# Patient Record
Sex: MALE | Race: WHITE | Employment: OTHER | URBAN - METROPOLITAN AREA
[De-identification: names, ages, dates, MRNs, and addresses within clinical notes are randomized per-mention and may not be internally consistent; named-entity substitution may affect disease eponyms.]

---

## 2017-04-17 ENCOUNTER — HOSPITAL ENCOUNTER (OUTPATIENT)
Dept: CT IMAGING | Age: 82
Discharge: HOME | End: 2017-04-17
Attending: FAMILY MEDICINE | Admitting: FAMILY MEDICINE

## 2017-04-17 DIAGNOSIS — R74.01 NONSPECIFIC ELEVATION OF LEVELS OF TRANSAMINASE AND LACTIC ACID DEHYDROGENASE (LDH): ICD-10-CM

## 2017-04-17 DIAGNOSIS — Z85.51 PERSONAL HISTORY OF MALIGNANT NEOPLASM OF BLADDER: ICD-10-CM

## 2017-04-17 DIAGNOSIS — R74.01 NONSPECIFIC ELEVATION OF LEVELS OF TRANSAMINASE OR LACTIC ACID DEHYDROGENASE (LDH): ICD-10-CM

## 2017-04-17 DIAGNOSIS — R74.02 NONSPECIFIC ELEVATION OF LEVELS OF TRANSAMINASE AND LACTIC ACID DEHYDROGENASE (LDH): ICD-10-CM

## 2017-04-17 DIAGNOSIS — R74.02 NONSPECIFIC ELEVATION OF LEVELS OF TRANSAMINASE OR LACTIC ACID DEHYDROGENASE (LDH): ICD-10-CM

## 2017-04-17 LAB — POC CREATININE: 1.1 MG/DL (ref 0.9–1.3)

## 2017-04-17 RX ORDER — SODIUM CHLORIDE 0.9 % (FLUSH) 0.9 %
10 SYRINGE (ML) INJECTION
Status: COMPLETED | OUTPATIENT
Start: 2017-04-17 | End: 2017-04-17

## 2017-04-17 RX ADMIN — Medication 10 ML: at 13:03

## 2017-06-12 ENCOUNTER — HOSPITAL ENCOUNTER (OUTPATIENT)
Dept: GENERAL RADIOLOGY | Age: 82
Discharge: HOME | End: 2017-06-12
Attending: INTERNAL MEDICINE | Admitting: INTERNAL MEDICINE

## 2017-06-12 LAB
ANION GAP SERPL CALCULATED.3IONS-SCNC: 15 MMOL/L (ref 4–16)
APTT: 39.5 SECONDS (ref 21.2–33)
BASOPHILS ABSOLUTE: 0.1 K/CU MM
BASOPHILS RELATIVE PERCENT: 1 % (ref 0–1)
BUN BLDV-MCNC: 16 MG/DL (ref 6–23)
CALCIUM SERPL-MCNC: 10.1 MG/DL (ref 8.3–10.6)
CHLORIDE BLD-SCNC: 101 MMOL/L (ref 99–110)
CO2: 23 MMOL/L (ref 21–32)
CREAT SERPL-MCNC: 1 MG/DL (ref 0.9–1.3)
DIFFERENTIAL TYPE: ABNORMAL
EOSINOPHILS ABSOLUTE: 0.2 K/CU MM
EOSINOPHILS RELATIVE PERCENT: 2.1 % (ref 0–3)
GFR AFRICAN AMERICAN: >60 ML/MIN/1.73M2
GFR NON-AFRICAN AMERICAN: >60 ML/MIN/1.73M2
GLUCOSE BLD-MCNC: 140 MG/DL (ref 70–140)
HCT VFR BLD CALC: 45.5 % (ref 42–52)
HEMOGLOBIN: 15.1 GM/DL (ref 13.5–18)
IMMATURE NEUTROPHIL %: 0.1 % (ref 0–0.43)
INR BLD: 1.06 INDEX
LYMPHOCYTES ABSOLUTE: 2.4 K/CU MM
LYMPHOCYTES RELATIVE PERCENT: 33.9 % (ref 24–44)
MCH RBC QN AUTO: 32.7 PG (ref 27–31)
MCHC RBC AUTO-ENTMCNC: 33.2 % (ref 32–36)
MCV RBC AUTO: 98.5 FL (ref 78–100)
MONOCYTES ABSOLUTE: 0.5 K/CU MM
MONOCYTES RELATIVE PERCENT: 7.2 % (ref 0–4)
NUCLEATED RBC %: 0 %
PDW BLD-RTO: 13.5 % (ref 11.7–14.9)
PLATELET # BLD: 175 K/CU MM (ref 140–440)
PMV BLD AUTO: 11.1 FL (ref 7.5–11.1)
POTASSIUM SERPL-SCNC: 4.3 MMOL/L (ref 3.5–5.1)
PROTHROMBIN TIME: 12.1 SECONDS (ref 9.12–12.5)
RBC # BLD: 4.62 M/CU MM (ref 4.6–6.2)
SEGMENTED NEUTROPHILS ABSOLUTE COUNT: 3.9 K/CU MM
SEGMENTED NEUTROPHILS RELATIVE PERCENT: 55.7 % (ref 36–66)
SODIUM BLD-SCNC: 139 MMOL/L (ref 135–145)
TOTAL IMMATURE NEUTOROPHIL: 0.01 K/CU MM
TOTAL NUCLEATED RBC: 0 K/CU MM
WBC # BLD: 7 K/CU MM (ref 4–10.5)

## 2017-06-20 PROBLEM — I25.10 CORONARY ARTERY DISEASE: Status: ACTIVE | Noted: 2017-06-20

## 2017-06-20 PROBLEM — K80.20 CHOLELITHIASIS: Status: ACTIVE | Noted: 2017-06-20

## 2017-06-20 PROBLEM — K74.60 CIRRHOSIS (HCC): Status: ACTIVE | Noted: 2017-06-20

## 2017-06-23 ENCOUNTER — HOSPITAL ENCOUNTER (OUTPATIENT)
Dept: PREADMISSION TESTING | Age: 82
Discharge: HOME | End: 2017-06-23
Attending: THORACIC SURGERY (CARDIOTHORACIC VASCULAR SURGERY) | Admitting: THORACIC SURGERY (CARDIOTHORACIC VASCULAR SURGERY)

## 2017-06-23 VITALS
SYSTOLIC BLOOD PRESSURE: 119 MMHG | BODY MASS INDEX: 31.8 KG/M2 | WEIGHT: 227.13 LBS | TEMPERATURE: 96.4 F | DIASTOLIC BLOOD PRESSURE: 56 MMHG | OXYGEN SATURATION: 96 % | HEIGHT: 71 IN | RESPIRATION RATE: 16 BRPM | HEART RATE: 66 BPM

## 2017-06-23 LAB
ANION GAP SERPL CALCULATED.3IONS-SCNC: 14 MMOL/L (ref 4–16)
APTT: 43.6 SECONDS (ref 21.2–33)
BACTERIA: NEGATIVE /HPF
BASOPHILS ABSOLUTE: 0.1 K/CU MM
BASOPHILS RELATIVE PERCENT: 1 % (ref 0–1)
BILIRUBIN URINE: NEGATIVE MG/DL
BLOOD, URINE: NEGATIVE
BUN BLDV-MCNC: 17 MG/DL (ref 6–23)
CALCIUM SERPL-MCNC: 9.8 MG/DL (ref 8.3–10.6)
CHLORIDE BLD-SCNC: 100 MMOL/L (ref 99–110)
CLARITY: CLEAR
CO2: 24 MMOL/L (ref 21–32)
COLOR: ABNORMAL
CREAT SERPL-MCNC: 1 MG/DL (ref 0.9–1.3)
DIFFERENTIAL TYPE: ABNORMAL
EOSINOPHILS ABSOLUTE: 0.1 K/CU MM
EOSINOPHILS RELATIVE PERCENT: 1.8 % (ref 0–3)
GFR AFRICAN AMERICAN: >60 ML/MIN/1.73M2
GFR NON-AFRICAN AMERICAN: >60 ML/MIN/1.73M2
GLUCOSE BLD-MCNC: 150 MG/DL (ref 70–140)
GLUCOSE, URINE: NEGATIVE MG/DL
HCT VFR BLD CALC: 44.1 % (ref 42–52)
HEMOGLOBIN: 14.7 GM/DL (ref 13.5–18)
IMMATURE NEUTROPHIL %: 0.1 % (ref 0–0.43)
INR BLD: 1.09 INDEX
KETONES, URINE: NEGATIVE MG/DL
LEUKOCYTE ESTERASE, URINE: NEGATIVE
LYMPHOCYTES ABSOLUTE: 2.1 K/CU MM
LYMPHOCYTES RELATIVE PERCENT: 29.1 % (ref 24–44)
MAGNESIUM: 1.9 MG/DL (ref 1.8–2.4)
MCH RBC QN AUTO: 32.8 PG (ref 27–31)
MCHC RBC AUTO-ENTMCNC: 33.3 % (ref 32–36)
MCV RBC AUTO: 98.4 FL (ref 78–100)
MONOCYTES ABSOLUTE: 0.5 K/CU MM
MONOCYTES RELATIVE PERCENT: 7.1 % (ref 0–4)
MUCUS: ABNORMAL HPF
NITRITE URINE, QUANTITATIVE: NEGATIVE
NUCLEATED RBC %: 0 %
PDW BLD-RTO: 13.3 % (ref 11.7–14.9)
PH, URINE: 5 (ref 5–8)
PLATELET # BLD: 166 K/CU MM (ref 140–440)
PMV BLD AUTO: 10.5 FL (ref 7.5–11.1)
POTASSIUM SERPL-SCNC: 4.4 MMOL/L (ref 3.5–5.1)
PROTEIN UA: NEGATIVE MG/DL
PROTHROMBIN TIME: 12.5 SECONDS (ref 9.12–12.5)
RBC # BLD: 4.48 M/CU MM (ref 4.6–6.2)
RBC URINE: <1 /HPF (ref 0–3)
SEGMENTED NEUTROPHILS ABSOLUTE COUNT: 4.4 K/CU MM
SEGMENTED NEUTROPHILS RELATIVE PERCENT: 60.9 % (ref 36–66)
SODIUM BLD-SCNC: 138 MMOL/L (ref 135–145)
SPECIFIC GRAVITY UA: 1.01 (ref 1–1.03)
SQUAMOUS EPITHELIAL: <1 /HPF
TOTAL IMMATURE NEUTOROPHIL: 0.01 K/CU MM
TOTAL NUCLEATED RBC: 0 K/CU MM
UROBILINOGEN, URINE: NORMAL MG/DL (ref 0.2–1)
WBC # BLD: 7.2 K/CU MM (ref 4–10.5)
WBC UA: <1 /HPF (ref 0–2)

## 2017-06-23 RX ORDER — SIMVASTATIN 40 MG
40 TABLET ORAL ONCE
Status: CANCELLED | OUTPATIENT
Start: 2017-06-26

## 2017-06-23 RX ORDER — CARVEDILOL 6.25 MG/1
6.25 TABLET ORAL 2 TIMES DAILY
Status: ON HOLD | COMMUNITY
End: 2017-07-07 | Stop reason: HOSPADM

## 2017-06-23 RX ORDER — CARVEDILOL 3.12 MG/1
3.12 TABLET ORAL ONCE
Status: CANCELLED | OUTPATIENT
Start: 2017-06-26

## 2017-06-23 RX ORDER — CEFAZOLIN SODIUM 2 G/100ML
2 INJECTION, SOLUTION INTRAVENOUS ONCE
Status: CANCELLED | OUTPATIENT
Start: 2017-06-26

## 2017-06-23 RX ORDER — COLCHICINE 0.6 MG/1
0.6 TABLET ORAL 2 TIMES DAILY
COMMUNITY

## 2017-06-23 RX ORDER — ESOMEPRAZOLE MAGNESIUM 40 MG/1
40 CAPSULE, DELAYED RELEASE ORAL EVERY MORNING
COMMUNITY
End: 2019-10-01 | Stop reason: ALTCHOICE

## 2017-06-23 RX ORDER — CHLORHEXIDINE GLUCONATE 0.12 MG/ML
30 RINSE ORAL ONCE
Status: CANCELLED | OUTPATIENT
Start: 2017-06-26

## 2017-06-23 RX ORDER — LOSARTAN POTASSIUM 50 MG/1
50 TABLET ORAL EVERY MORNING
Status: ON HOLD | COMMUNITY
End: 2017-07-12 | Stop reason: HOSPADM

## 2017-06-23 ASSESSMENT — PAIN SCALES - GENERAL: PAINLEVEL_OUTOF10: 0

## 2017-06-24 LAB
CULTURE: NORMAL
REPORT STATUS: NORMAL
REQUEST PROBLEM: NORMAL
SPECIMEN: NORMAL

## 2017-07-01 ENCOUNTER — HOSPITAL ENCOUNTER (OUTPATIENT)
Dept: OTHER | Age: 82
Discharge: HOME | End: 2017-07-01
Attending: SKILLED NURSING FACILITY | Admitting: SKILLED NURSING FACILITY

## 2017-07-12 PROBLEM — I49.8 BIGEMINY: Status: ACTIVE | Noted: 2017-07-12

## 2017-07-12 PROCEDURE — 99305 1ST NF CARE MODERATE MDM 35: CPT | Performed by: INTERNAL MEDICINE

## 2017-07-13 LAB
HCT VFR BLD CALC: 34.2 % (ref 42–52)
HEMOGLOBIN: 10.9 GM/DL (ref 13.5–18)
MCH RBC QN AUTO: 32.2 PG (ref 27–31)
MCHC RBC AUTO-ENTMCNC: 31.9 % (ref 32–36)
MCV RBC AUTO: 100.9 FL (ref 78–100)
PDW BLD-RTO: 14.2 % (ref 11.7–14.9)
PLATELET # BLD: 329 K/CU MM (ref 140–440)
PMV BLD AUTO: 10.9 FL (ref 7.5–11.1)
RBC # BLD: 3.39 M/CU MM (ref 4.6–6.2)
WBC # BLD: 8.5 K/CU MM (ref 4–10.5)

## 2017-07-20 LAB
HCT VFR BLD CALC: 36.9 % (ref 42–52)
HEMOGLOBIN: 11.8 GM/DL (ref 13.5–18)
MCH RBC QN AUTO: 32.1 PG (ref 27–31)
MCHC RBC AUTO-ENTMCNC: 32 % (ref 32–36)
MCV RBC AUTO: 100.3 FL (ref 78–100)
PDW BLD-RTO: 13.8 % (ref 11.7–14.9)
PLATELET # BLD: 305 K/CU MM (ref 140–440)
PMV BLD AUTO: 11 FL (ref 7.5–11.1)
RBC # BLD: 3.68 M/CU MM (ref 4.6–6.2)
WBC # BLD: 6.6 K/CU MM (ref 4–10.5)

## 2017-07-27 LAB
HCT VFR BLD CALC: 43.7 % (ref 42–52)
HEMOGLOBIN: 13.9 GM/DL (ref 13.5–18)
MCH RBC QN AUTO: 32.1 PG (ref 27–31)
MCHC RBC AUTO-ENTMCNC: 31.8 % (ref 32–36)
MCV RBC AUTO: 100.9 FL (ref 78–100)
PDW BLD-RTO: 13.5 % (ref 11.7–14.9)
PLATELET # BLD: 292 K/CU MM (ref 140–440)
PMV BLD AUTO: 10.3 FL (ref 7.5–11.1)
RBC # BLD: 4.33 M/CU MM (ref 4.6–6.2)
WBC # BLD: 7.1 K/CU MM (ref 4–10.5)

## 2017-08-06 ENCOUNTER — OUTSIDE SERVICES (OUTPATIENT)
Dept: INTERNAL MEDICINE CLINIC | Age: 82
End: 2017-08-06

## 2019-04-02 LAB
A/G RATIO: 1.1 (ref 1.1–2.2)
ALBUMIN SERPL-MCNC: 3.9 G/DL (ref 3.4–5)
ALP BLD-CCNC: 82 U/L (ref 40–129)
ALT SERPL-CCNC: 13 U/L (ref 10–40)
ANION GAP SERPL CALCULATED.3IONS-SCNC: 12 MMOL/L (ref 3–16)
AST SERPL-CCNC: 24 U/L (ref 15–37)
BASOPHILS ABSOLUTE: 0.1 K/UL (ref 0–0.2)
BASOPHILS RELATIVE PERCENT: 1.2 %
BILIRUB SERPL-MCNC: 1 MG/DL (ref 0–1)
BUN BLDV-MCNC: 16 MG/DL (ref 7–20)
CALCIUM SERPL-MCNC: 9.8 MG/DL (ref 8.3–10.6)
CHLORIDE BLD-SCNC: 101 MMOL/L (ref 99–110)
CHOLESTEROL, TOTAL: 200 MG/DL (ref 0–199)
CO2: 25 MMOL/L (ref 21–32)
CREAT SERPL-MCNC: 1.1 MG/DL (ref 0.8–1.3)
EOSINOPHILS ABSOLUTE: 0.1 K/UL (ref 0–0.6)
EOSINOPHILS RELATIVE PERCENT: 2.5 %
GFR AFRICAN AMERICAN: >60
GFR NON-AFRICAN AMERICAN: >60
GLOBULIN: 3.7 G/DL
GLUCOSE BLD-MCNC: 116 MG/DL (ref 70–99)
HCT VFR BLD CALC: 41.1 % (ref 40.5–52.5)
HDLC SERPL-MCNC: 33 MG/DL (ref 40–60)
HEMOGLOBIN: 13.2 G/DL (ref 13.5–17.5)
LDL CHOLESTEROL CALCULATED: 143 MG/DL
LYMPHOCYTES ABSOLUTE: 2.1 K/UL (ref 1–5.1)
LYMPHOCYTES RELATIVE PERCENT: 36.2 %
MCH RBC QN AUTO: 29.4 PG (ref 26–34)
MCHC RBC AUTO-ENTMCNC: 32.2 G/DL (ref 31–36)
MCV RBC AUTO: 91.2 FL (ref 80–100)
MONOCYTES ABSOLUTE: 0.5 K/UL (ref 0–1.3)
MONOCYTES RELATIVE PERCENT: 8.7 %
NEUTROPHILS ABSOLUTE: 3 K/UL (ref 1.7–7.7)
NEUTROPHILS RELATIVE PERCENT: 51.4 %
PDW BLD-RTO: 16.4 % (ref 12.4–15.4)
PLATELET # BLD: 176 K/UL (ref 135–450)
PMV BLD AUTO: 9.2 FL (ref 5–10.5)
POTASSIUM SERPL-SCNC: 5.2 MMOL/L (ref 3.5–5.1)
RBC # BLD: 4.5 M/UL (ref 4.2–5.9)
SODIUM BLD-SCNC: 138 MMOL/L (ref 136–145)
TOTAL PROTEIN: 7.6 G/DL (ref 6.4–8.2)
TRIGL SERPL-MCNC: 121 MG/DL (ref 0–150)
TSH SERPL DL<=0.05 MIU/L-ACNC: 2.55 UIU/ML (ref 0.27–4.2)
VLDLC SERPL CALC-MCNC: 24 MG/DL
WBC # BLD: 5.8 K/UL (ref 4–11)

## 2019-08-04 DIAGNOSIS — R97.20 RAISED PROSTATE SPECIFIC ANTIGEN: ICD-10-CM

## 2019-08-04 DIAGNOSIS — K74.60 CIRRHOSIS, NON-ALCOHOLIC (HCC): ICD-10-CM

## 2019-08-04 DIAGNOSIS — Z85.46 HX OF MALIGNANT NEOPLASM OF PROSTATE: ICD-10-CM

## 2019-08-04 DIAGNOSIS — I10 HYPERTENSION, ESSENTIAL: ICD-10-CM

## 2019-08-04 DIAGNOSIS — R79.89 OTHER SPECIFIED ABNORMAL FINDINGS OF BLOOD CHEMISTRY: ICD-10-CM

## 2019-08-04 DIAGNOSIS — E04.1 THYROID NODULE: ICD-10-CM

## 2019-08-04 DIAGNOSIS — C61 MALIGNANT TUMOR OF PROSTATE (HCC): ICD-10-CM

## 2019-08-04 DIAGNOSIS — R31.0 FRANK HEMATURIA: ICD-10-CM

## 2019-08-04 RX ORDER — ATORVASTATIN CALCIUM 10 MG/1
10 TABLET, FILM COATED ORAL DAILY
COMMUNITY
End: 2019-10-01

## 2019-08-04 RX ORDER — FERROUS SULFATE 325(65) MG
325 TABLET ORAL 2 TIMES DAILY
COMMUNITY
End: 2019-10-01

## 2019-08-04 RX ORDER — PANTOPRAZOLE SODIUM 40 MG/1
40 TABLET, DELAYED RELEASE ORAL DAILY
COMMUNITY
End: 2020-04-01 | Stop reason: SDUPTHER

## 2019-10-01 ENCOUNTER — OFFICE VISIT (OUTPATIENT)
Dept: FAMILY MEDICINE CLINIC | Age: 84
End: 2019-10-01
Payer: MEDICARE

## 2019-10-01 VITALS
WEIGHT: 242.8 LBS | SYSTOLIC BLOOD PRESSURE: 115 MMHG | HEART RATE: 60 BPM | HEIGHT: 73 IN | DIASTOLIC BLOOD PRESSURE: 72 MMHG | BODY MASS INDEX: 32.18 KG/M2

## 2019-10-01 DIAGNOSIS — Z23 NEED FOR PROPHYLACTIC VACCINATION AND INOCULATION AGAINST VARICELLA: ICD-10-CM

## 2019-10-01 DIAGNOSIS — I10 ESSENTIAL HYPERTENSION: ICD-10-CM

## 2019-10-01 DIAGNOSIS — I25.10 CORONARY ARTERY DISEASE INVOLVING NATIVE CORONARY ARTERY OF NATIVE HEART WITHOUT ANGINA PECTORIS: ICD-10-CM

## 2019-10-01 DIAGNOSIS — Z85.46 HX OF MALIGNANT NEOPLASM OF PROSTATE: Primary | ICD-10-CM

## 2019-10-01 DIAGNOSIS — I10 HYPERTENSION, ESSENTIAL: ICD-10-CM

## 2019-10-01 DIAGNOSIS — Z23 NEED FOR PROPHYLACTIC VACCINATION AGAINST STREPTOCOCCUS PNEUMONIAE (PNEUMOCOCCUS): ICD-10-CM

## 2019-10-01 DIAGNOSIS — M10.9 GOUT, UNSPECIFIED CAUSE, UNSPECIFIED CHRONICITY, UNSPECIFIED SITE: Chronic | ICD-10-CM

## 2019-10-01 DIAGNOSIS — E11.9 TYPE 2 DIABETES MELLITUS WITHOUT COMPLICATION, WITHOUT LONG-TERM CURRENT USE OF INSULIN (HCC): ICD-10-CM

## 2019-10-01 LAB
A/G RATIO: 1.1 (ref 1.1–2.2)
ALBUMIN SERPL-MCNC: 3.8 G/DL (ref 3.4–5)
ALP BLD-CCNC: 77 U/L (ref 40–129)
ALT SERPL-CCNC: 13 U/L (ref 10–40)
ANION GAP SERPL CALCULATED.3IONS-SCNC: 11 MMOL/L (ref 3–16)
AST SERPL-CCNC: 33 U/L (ref 15–37)
BILIRUB SERPL-MCNC: 0.5 MG/DL (ref 0–1)
BUN BLDV-MCNC: 17 MG/DL (ref 7–20)
CALCIUM SERPL-MCNC: 9.4 MG/DL (ref 8.3–10.6)
CHLORIDE BLD-SCNC: 104 MMOL/L (ref 99–110)
CHOLESTEROL, TOTAL: 179 MG/DL (ref 0–199)
CO2: 24 MMOL/L (ref 21–32)
CREAT SERPL-MCNC: 1.2 MG/DL (ref 0.8–1.3)
GFR AFRICAN AMERICAN: >60
GFR NON-AFRICAN AMERICAN: 58
GLOBULIN: 3.5 G/DL
GLUCOSE BLD-MCNC: 93 MG/DL (ref 70–99)
HCT VFR BLD CALC: 35.5 % (ref 40.5–52.5)
HDLC SERPL-MCNC: 31 MG/DL (ref 40–60)
HEMOGLOBIN: 11.3 G/DL (ref 13.5–17.5)
LDL CHOLESTEROL CALCULATED: 122 MG/DL
MCH RBC QN AUTO: 27.8 PG (ref 26–34)
MCHC RBC AUTO-ENTMCNC: 31.9 G/DL (ref 31–36)
MCV RBC AUTO: 87.3 FL (ref 80–100)
PDW BLD-RTO: 15.9 % (ref 12.4–15.4)
PLATELET # BLD: 180 K/UL (ref 135–450)
PMV BLD AUTO: 9 FL (ref 5–10.5)
POTASSIUM SERPL-SCNC: 4.7 MMOL/L (ref 3.5–5.1)
RBC # BLD: 4.07 M/UL (ref 4.2–5.9)
SODIUM BLD-SCNC: 139 MMOL/L (ref 136–145)
TOTAL PROTEIN: 7.3 G/DL (ref 6.4–8.2)
TRIGL SERPL-MCNC: 131 MG/DL (ref 0–150)
VLDLC SERPL CALC-MCNC: 26 MG/DL
WBC # BLD: 5.6 K/UL (ref 4–11)

## 2019-10-01 PROCEDURE — 4040F PNEUMOC VAC/ADMIN/RCVD: CPT | Performed by: FAMILY MEDICINE

## 2019-10-01 PROCEDURE — G8427 DOCREV CUR MEDS BY ELIG CLIN: HCPCS | Performed by: FAMILY MEDICINE

## 2019-10-01 PROCEDURE — G8482 FLU IMMUNIZE ORDER/ADMIN: HCPCS | Performed by: FAMILY MEDICINE

## 2019-10-01 PROCEDURE — 90653 IIV ADJUVANT VACCINE IM: CPT | Performed by: FAMILY MEDICINE

## 2019-10-01 PROCEDURE — G8598 ASA/ANTIPLAT THER USED: HCPCS | Performed by: FAMILY MEDICINE

## 2019-10-01 PROCEDURE — 1036F TOBACCO NON-USER: CPT | Performed by: FAMILY MEDICINE

## 2019-10-01 PROCEDURE — 90732 PPSV23 VACC 2 YRS+ SUBQ/IM: CPT | Performed by: FAMILY MEDICINE

## 2019-10-01 PROCEDURE — G0009 ADMIN PNEUMOCOCCAL VACCINE: HCPCS | Performed by: FAMILY MEDICINE

## 2019-10-01 PROCEDURE — G0008 ADMIN INFLUENZA VIRUS VAC: HCPCS | Performed by: FAMILY MEDICINE

## 2019-10-01 PROCEDURE — 99214 OFFICE O/P EST MOD 30 MIN: CPT | Performed by: FAMILY MEDICINE

## 2019-10-01 PROCEDURE — 1123F ACP DISCUSS/DSCN MKR DOCD: CPT | Performed by: FAMILY MEDICINE

## 2019-10-01 PROCEDURE — G8417 CALC BMI ABV UP PARAM F/U: HCPCS | Performed by: FAMILY MEDICINE

## 2019-10-01 RX ORDER — CLOPIDOGREL BISULFATE 75 MG/1
75 TABLET ORAL DAILY
Qty: 90 TABLET | Refills: 1 | Status: SHIPPED | OUTPATIENT
Start: 2019-10-01 | End: 2020-04-01 | Stop reason: SDUPTHER

## 2019-10-01 RX ORDER — ALLOPURINOL 100 MG/1
100 TABLET ORAL DAILY
Qty: 90 TABLET | Refills: 1 | Status: SHIPPED | OUTPATIENT
Start: 2019-10-01 | End: 2020-04-01 | Stop reason: SDUPTHER

## 2019-10-01 ASSESSMENT — PATIENT HEALTH QUESTIONNAIRE - PHQ9
SUM OF ALL RESPONSES TO PHQ QUESTIONS 1-9: 0
SUM OF ALL RESPONSES TO PHQ QUESTIONS 1-9: 0
2. FEELING DOWN, DEPRESSED OR HOPELESS: 0
SUM OF ALL RESPONSES TO PHQ9 QUESTIONS 1 & 2: 0
1. LITTLE INTEREST OR PLEASURE IN DOING THINGS: 0

## 2019-10-01 ASSESSMENT — ENCOUNTER SYMPTOMS
SHORTNESS OF BREATH: 0
EYES NEGATIVE: 1
CHEST TIGHTNESS: 0
ABDOMINAL PAIN: 0

## 2019-11-29 ENCOUNTER — HOSPITAL ENCOUNTER (OUTPATIENT)
Age: 84
Discharge: HOME | End: 2019-11-29
Payer: MEDICARE

## 2019-11-29 LAB — PROSTATE SPECIFIC ANTIGEN: 4.36 NG/ML (ref 0–4)

## 2019-11-29 PROCEDURE — 36415 COLL VENOUS BLD VENIPUNCTURE: CPT

## 2019-11-29 PROCEDURE — G0103 PSA SCREENING: HCPCS

## 2019-12-20 ENCOUNTER — HOSPITAL ENCOUNTER (OUTPATIENT)
Dept: CT IMAGING | Age: 84
Discharge: HOME | End: 2019-12-20
Payer: MEDICARE

## 2019-12-20 ENCOUNTER — HOSPITAL ENCOUNTER (OUTPATIENT)
Dept: NUCLEAR MEDICINE | Age: 84
Discharge: HOME | End: 2019-12-20
Payer: MEDICARE

## 2019-12-20 DIAGNOSIS — C61 PROSTATE CANCER (HCC): ICD-10-CM

## 2019-12-20 LAB
GFR AFRICAN AMERICAN: >60 ML/MIN/1.73M2
GFR NON-AFRICAN AMERICAN: 53 ML/MIN/1.73M2
POC CREATININE: 1.3 MG/DL (ref 0.9–1.3)

## 2019-12-20 PROCEDURE — 6360000004 HC RX CONTRAST MEDICATION: Performed by: UROLOGY

## 2019-12-20 PROCEDURE — 72193 CT PELVIS W/DYE: CPT

## 2019-12-20 PROCEDURE — A9503 TC99M MEDRONATE: HCPCS | Performed by: UROLOGY

## 2019-12-20 PROCEDURE — 78306 BONE IMAGING WHOLE BODY: CPT

## 2019-12-20 PROCEDURE — 3430000000 HC RX DIAGNOSTIC RADIOPHARMACEUTICAL: Performed by: UROLOGY

## 2019-12-20 RX ORDER — TC 99M MEDRONATE 20 MG/10ML
25 INJECTION, POWDER, LYOPHILIZED, FOR SOLUTION INTRAVENOUS
Status: COMPLETED | OUTPATIENT
Start: 2019-12-20 | End: 2019-12-20

## 2019-12-20 RX ADMIN — TC 99M MEDRONATE 25 MILLICURIE: 20 INJECTION, POWDER, LYOPHILIZED, FOR SOLUTION INTRAVENOUS at 10:15

## 2019-12-20 RX ADMIN — IOPAMIDOL 80 ML: 755 INJECTION, SOLUTION INTRAVENOUS at 12:29

## 2020-02-24 ENCOUNTER — HOSPITAL ENCOUNTER (OUTPATIENT)
Age: 85
Discharge: HOME | End: 2020-02-24
Payer: MEDICARE

## 2020-02-24 ENCOUNTER — HOSPITAL ENCOUNTER (OUTPATIENT)
Dept: GENERAL RADIOLOGY | Age: 85
Discharge: HOME | End: 2020-02-24
Payer: MEDICARE

## 2020-02-24 PROCEDURE — 71046 X-RAY EXAM CHEST 2 VIEWS: CPT

## 2020-04-01 ENCOUNTER — VIRTUAL VISIT (OUTPATIENT)
Dept: FAMILY MEDICINE CLINIC | Age: 85
End: 2020-04-01
Payer: MEDICARE

## 2020-04-01 PROCEDURE — G2012 BRIEF CHECK IN BY MD/QHP: HCPCS | Performed by: FAMILY MEDICINE

## 2020-04-01 RX ORDER — AMIODARONE HYDROCHLORIDE 200 MG/1
200 TABLET ORAL DAILY
Qty: 90 TABLET | Refills: 1 | Status: SHIPPED | OUTPATIENT
Start: 2020-04-01 | End: 2020-12-22

## 2020-04-01 RX ORDER — PANTOPRAZOLE SODIUM 40 MG/1
40 TABLET, DELAYED RELEASE ORAL DAILY
Qty: 90 TABLET | Refills: 1 | Status: SHIPPED | OUTPATIENT
Start: 2020-04-01 | End: 2021-02-22 | Stop reason: SDUPTHER

## 2020-04-01 RX ORDER — ALLOPURINOL 100 MG/1
100 TABLET ORAL DAILY
Qty: 90 TABLET | Refills: 1 | Status: SHIPPED | OUTPATIENT
Start: 2020-04-01 | End: 2020-10-20 | Stop reason: SDUPTHER

## 2020-04-01 RX ORDER — CLOPIDOGREL BISULFATE 75 MG/1
75 TABLET ORAL DAILY
Qty: 90 TABLET | Refills: 1 | Status: SHIPPED | OUTPATIENT
Start: 2020-04-01 | End: 2020-11-12

## 2020-04-01 NOTE — PROGRESS NOTES
Christen Giordano is a 80 y.o. male evaluated via telephone on 4/1/2020. Consent:  He and/or health care decision maker is aware that that he may receive a bill for this telephone service, depending on his insurance coverage, and has provided verbal consent to proceed: Yes      Documentation:  I communicated with the patient and/or health care decision maker about hypertension, gout, GERD, CA prostate, coronary disease, and TIAs. Patient states that his GERD symptoms have been controlled, and he he has had no flare of gout. He is followedclosely by urology for his CA prostate and CA bladder. And he has had a recent PET scan that showed up in epic that showed no acute hotspots. He has follow-up coming with Urology in about 1 month. Currently denies chest pain, shortness of breath, or focal neurologic changes. There have been no medication changes at this point. He will simply get refills and continue with healthy lifestyle including exercise and avoidance of other ill individuals. Will provide refills on meds as needed and have him call for appointment in 2 months or so- we will get labs at that time. Details of this discussion including any medical advice provided: see above      I affirm this is a Patient Initiated Episode with an Established Patient who has not had a related appointment within my department in the past 7 days or scheduled within the next 24 hours.     Total Time: minutes: 5-10 minutes    Note: not billable if this call serves to triage the patient into an appointment for the relevant concern      Rik Guidry

## 2020-04-20 ENCOUNTER — TELEPHONE (OUTPATIENT)
Dept: FAMILY MEDICINE CLINIC | Age: 85
End: 2020-04-20

## 2020-04-20 ENCOUNTER — OFFICE VISIT (OUTPATIENT)
Dept: FAMILY MEDICINE CLINIC | Age: 85
End: 2020-04-20
Payer: MEDICARE

## 2020-04-20 VITALS
HEIGHT: 73 IN | BODY MASS INDEX: 31.94 KG/M2 | SYSTOLIC BLOOD PRESSURE: 124 MMHG | TEMPERATURE: 96.8 F | DIASTOLIC BLOOD PRESSURE: 66 MMHG | HEART RATE: 56 BPM | WEIGHT: 241 LBS

## 2020-04-20 DIAGNOSIS — R55 SYNCOPE, UNSPECIFIED SYNCOPE TYPE: ICD-10-CM

## 2020-04-20 LAB
A/G RATIO: 1 (ref 1.1–2.2)
ALBUMIN SERPL-MCNC: 3.6 G/DL (ref 3.4–5)
ALP BLD-CCNC: 89 U/L (ref 40–129)
ALT SERPL-CCNC: 13 U/L (ref 10–40)
ANION GAP SERPL CALCULATED.3IONS-SCNC: 13 MMOL/L (ref 3–16)
AST SERPL-CCNC: 24 U/L (ref 15–37)
BILIRUB SERPL-MCNC: 0.4 MG/DL (ref 0–1)
BUN BLDV-MCNC: 22 MG/DL (ref 7–20)
CALCIUM SERPL-MCNC: 9.8 MG/DL (ref 8.3–10.6)
CHLORIDE BLD-SCNC: 105 MMOL/L (ref 99–110)
CO2: 23 MMOL/L (ref 21–32)
CREAT SERPL-MCNC: 1.1 MG/DL (ref 0.8–1.3)
GFR AFRICAN AMERICAN: >60
GFR NON-AFRICAN AMERICAN: >60
GLOBULIN: 3.6 G/DL
GLUCOSE BLD-MCNC: 164 MG/DL (ref 70–99)
HCT VFR BLD CALC: 29.3 % (ref 40.5–52.5)
HEMOGLOBIN: 8.7 G/DL (ref 13.5–17.5)
MAGNESIUM: 2 MG/DL (ref 1.8–2.4)
MCH RBC QN AUTO: 22.8 PG (ref 26–34)
MCHC RBC AUTO-ENTMCNC: 29.7 G/DL (ref 31–36)
MCV RBC AUTO: 76.6 FL (ref 80–100)
PDW BLD-RTO: 18.2 % (ref 12.4–15.4)
PLATELET # BLD: 182 K/UL (ref 135–450)
PMV BLD AUTO: 8.7 FL (ref 5–10.5)
POTASSIUM SERPL-SCNC: 5 MMOL/L (ref 3.5–5.1)
RBC # BLD: 3.82 M/UL (ref 4.2–5.9)
SODIUM BLD-SCNC: 141 MMOL/L (ref 136–145)
TOTAL PROTEIN: 7.2 G/DL (ref 6.4–8.2)
TSH REFLEX: 2.14 UIU/ML (ref 0.27–4.2)
WBC # BLD: 5.1 K/UL (ref 4–11)

## 2020-04-20 PROCEDURE — 99214 OFFICE O/P EST MOD 30 MIN: CPT | Performed by: FAMILY MEDICINE

## 2020-04-20 ASSESSMENT — PATIENT HEALTH QUESTIONNAIRE - PHQ9
SUM OF ALL RESPONSES TO PHQ9 QUESTIONS 1 & 2: 0
2. FEELING DOWN, DEPRESSED OR HOPELESS: 0
SUM OF ALL RESPONSES TO PHQ QUESTIONS 1-9: 0
1. LITTLE INTEREST OR PLEASURE IN DOING THINGS: 0
SUM OF ALL RESPONSES TO PHQ QUESTIONS 1-9: 0

## 2020-04-20 ASSESSMENT — ENCOUNTER SYMPTOMS
ABDOMINAL PAIN: 0
TROUBLE SWALLOWING: 0
VOMITING: 0
BLOOD IN STOOL: 0
SHORTNESS OF BREATH: 0
CHEST TIGHTNESS: 0
DIARRHEA: 0
NAUSEA: 0
EYE PAIN: 0
WHEEZING: 0

## 2020-04-20 NOTE — PROGRESS NOTES
4/20/2020    Tereso Serrato    Chief Complaint   Patient presents with   Waleska Rodriguez     passed out in [place]. got dizzy when cold air hit him lost conciousness. unsure how long he was out. made it in the store. Doesn't remember but bruise on right shoulder. squad came. refused hospital.  HR was 50ish. No problems since fall. HPI  History was obtained from the patient and his daughter. Nikky Conteh is a 80 y.o. male who presents today with history of passing out while walking with a cart at [place] 2 days ago. Patient states he did lose consciousness -he was not dizzy. He did not have seizures and denies any focal neurologic changes and did not have any palpitations. Denies post event shortness of breath or chest pain. He denies precipitating vertigo or dizziness. He does not have memory for the actual event but can remember everything afterwards. Patient and daughter deny any other focalized neurologic deficit after this event. He currently feels back to normal other than a little bit of a shoulder discomfort. Gives interesting history of having a lifelong problem with passing out rather easily. On this occasion he felt that passing out  may have been from walking against the wind and having little bit of shortness of breath with that. He has not missed any of his medication. REVIEW OF SYMPTOMS    Review of Systems   Constitutional: Negative for activity change and fatigue. HENT: Negative for congestion, hearing loss, mouth sores and trouble swallowing. Eyes: Negative for pain and visual disturbance. Respiratory: Negative for chest tightness, shortness of breath and wheezing. Cardiovascular: Negative for chest pain and palpitations. Gastrointestinal: Negative for abdominal pain, blood in stool, diarrhea, nausea and vomiting. Genitourinary: Negative for dysuria, frequency and urgency. Musculoskeletal: Negative for arthralgias, gait problem and neck stiffness. Skin: Negative for rash. (PROTONIX) 40 MG tablet Take 1 tablet by mouth daily 90 tablet 1    metoprolol tartrate (LOPRESSOR) 25 MG tablet Take 1 tablet by mouth 2 times daily 180 tablet 1    amiodarone (CORDARONE) 200 MG tablet Take 1 tablet by mouth daily 90 tablet 1    bimatoprost (LUMIGAN) 0.01 % SOLN ophthalmic drops Place 1 drop into both eyes nightly      colchicine (COLCRYS) 0.6 MG tablet Take 0.6 mg by mouth 2 times daily      Dorzolamide HCl-Timolol Mal (COSOPT PF OP) Apply to eye every morning Right Eye      acetaminophen (TYLENOL) 325 MG tablet Take 2 tablets by mouth every 4 hours as needed for Pain 120 tablet 3     No current facility-administered medications for this visit. ALLERGIES  Allergies   Allergen Reactions    Lisinopril        PHYSICAL EXAM    /66 (Site: Right Upper Arm, Position: Sitting, Cuff Size: Large Adult)   Pulse 56   Temp 96.8 °F (36 °C)   Ht 6' 1\" (1.854 m)   Wt 241 lb (109.3 kg)   BMI 31.80 kg/m²     Physical Exam  Vitals signs and nursing note reviewed. Constitutional:       General: He is in acute distress. Appearance: Normal appearance. He is well-developed. He is ill-appearing. HENT:      Head: Normocephalic and atraumatic. Mouth/Throat:      Mouth: Mucous membranes are moist.   Eyes:      Pupils: Pupils are equal, round, and reactive to light. Neck:      Musculoskeletal: Normal range of motion and neck supple. Cardiovascular:      Rate and Rhythm: Normal rate and regular rhythm. Heart sounds: Normal heart sounds. Pulmonary:      Effort: Pulmonary effort is normal.      Breath sounds: Normal breath sounds. Abdominal:      Palpations: Abdomen is soft. Musculoskeletal: Normal range of motion. Skin:     General: Skin is warm and dry. Neurological:      General: No focal deficit present. Mental Status: He is alert and oriented to person, place, and time. Mental status is at baseline. Cranial Nerves: No cranial nerve deficit.       Motor: No

## 2020-04-22 ENCOUNTER — TELEPHONE (OUTPATIENT)
Dept: FAMILY MEDICINE CLINIC | Age: 85
End: 2020-04-22

## 2020-04-22 NOTE — TELEPHONE ENCOUNTER
Patient notified. States he will contact cardio about medications. Will start OTC iron BID and is ok with GI referral, has no preference of doctor. Do you want a referral to anyone specific?

## 2020-07-20 ENCOUNTER — OFFICE VISIT (OUTPATIENT)
Dept: FAMILY MEDICINE CLINIC | Age: 85
End: 2020-07-20
Payer: MEDICARE

## 2020-07-20 VITALS
OXYGEN SATURATION: 98 % | HEIGHT: 73 IN | DIASTOLIC BLOOD PRESSURE: 62 MMHG | BODY MASS INDEX: 32.07 KG/M2 | WEIGHT: 242 LBS | SYSTOLIC BLOOD PRESSURE: 130 MMHG | HEART RATE: 51 BPM | TEMPERATURE: 98.2 F

## 2020-07-20 DIAGNOSIS — E11.9 TYPE 2 DIABETES MELLITUS WITHOUT COMPLICATION, WITHOUT LONG-TERM CURRENT USE OF INSULIN (HCC): ICD-10-CM

## 2020-07-20 DIAGNOSIS — D50.0 IRON DEFICIENCY ANEMIA DUE TO CHRONIC BLOOD LOSS: ICD-10-CM

## 2020-07-20 DIAGNOSIS — I10 HYPERTENSION, ESSENTIAL: ICD-10-CM

## 2020-07-20 PROBLEM — I65.22 STENOSIS OF LEFT CAROTID ARTERY: Status: ACTIVE | Noted: 2020-07-20

## 2020-07-20 PROCEDURE — G8417 CALC BMI ABV UP PARAM F/U: HCPCS | Performed by: FAMILY MEDICINE

## 2020-07-20 PROCEDURE — G8427 DOCREV CUR MEDS BY ELIG CLIN: HCPCS | Performed by: FAMILY MEDICINE

## 2020-07-20 PROCEDURE — 1036F TOBACCO NON-USER: CPT | Performed by: FAMILY MEDICINE

## 2020-07-20 PROCEDURE — 1123F ACP DISCUSS/DSCN MKR DOCD: CPT | Performed by: FAMILY MEDICINE

## 2020-07-20 PROCEDURE — 99214 OFFICE O/P EST MOD 30 MIN: CPT | Performed by: FAMILY MEDICINE

## 2020-07-20 PROCEDURE — 4040F PNEUMOC VAC/ADMIN/RCVD: CPT | Performed by: FAMILY MEDICINE

## 2020-07-20 ASSESSMENT — ENCOUNTER SYMPTOMS
ABDOMINAL PAIN: 0
BLOOD IN STOOL: 0
EYE PAIN: 0
TROUBLE SWALLOWING: 0
SHORTNESS OF BREATH: 0
DIARRHEA: 0
WHEEZING: 0
VOMITING: 0
NAUSEA: 0
CHEST TIGHTNESS: 0

## 2020-07-20 NOTE — LETTER
[address] [place] [address]  Phone: [phone]  Fax: [phone]    Diana Luciano MD        July 20, 2020     Patient: Miranda Longo   YOB: 1935   Date of Visit: 7/20/2020       To Whom It May Concern: It is my medical opinion that Rj Chapman requires a disability parking placard for the following reasons:  He cannot walk without assistance from another person or the use of an assistance device (cane, crutch, prosthetic device, wheelchair, etc.). Duration of need: permanent    If you have any questions or concerns, please don't hesitate to call.     Sincerely,        Diana Luciano MD

## 2020-07-20 NOTE — PROGRESS NOTES
7/20/2020    Saba Cardoza    Chief Complaint   Patient presents with    3 Month Follow-Up     problems with heart Dr. Pita Collier say what.  appointment first of august    Other     unsure why he's here, unsure on medications       HPI  History was obtained from the patient. Pallavi Gomes is a 80 y.o. male who presents today with follow-up on anemia and syncope. Patient's hemoglobin was 8.7 a few months ago and was placed on ferrous sulfate 325 twice daily along with usual meds. He was instructed to follow-up with cardiology-they did not want him to change any of his anticoagulations or antiplatelet meds. He is still a little lightheaded but has not had further syncopal spells -has had some trouble communicating with cardiology about appropriate follow-up. He has known left carotid stenosis and patient wonders if he needs further evaluation. Sugars have not been too bad,and gout has not flared. Patient's energy is perhaps just a bit better- he did not see GI medicine as part of his anemia work-up because cardiology did not want him going off any his meds at this point. REVIEW OF SYMPTOMS    Review of Systems   Constitutional: Positive for fatigue. Negative for activity change. HENT: Negative for congestion, hearing loss, mouth sores and trouble swallowing. Eyes: Negative for pain and visual disturbance. Respiratory: Negative for chest tightness, shortness of breath and wheezing. Cardiovascular: Negative for chest pain and palpitations. Gastrointestinal: Negative for abdominal pain, blood in stool, diarrhea, nausea and vomiting. Endocrine: Negative for heat intolerance, polydipsia and polyuria. Genitourinary: Negative for dysuria, frequency and urgency. Musculoskeletal: Negative for arthralgias, gait problem and neck stiffness. Skin: Negative for rash. Allergic/Immunologic: Negative for environmental allergies. Neurological: Positive for syncope.  Negative for dizziness, seizures, speech difficulty and weakness. Hematological: Does not bruise/bleed easily. Psychiatric/Behavioral: Negative for agitation, confusion and hallucinations.        PAST MEDICAL HISTORY  Past Medical History:   Diagnosis Date    Acid reflux     CAD (coronary artery disease)     Sees Dr. Christy Nicholson    Cerebral artery occlusion with cerebral infarction ([facility] [address] 2016    No Residual     Cirrhosis, non-alcoholic (HCC)     Constipation     Coronary atherosclerosis     Mk hematuria     Full dentures     Gallstone     Glaucoma     Bilateral Eyes    Gout Last Episode 2014    \"Right Big Toe, Left Elbow\"    Hepatitis Dx 1960's    \"I Just Got Over It\"    Hx of malignant neoplasm of prostate     Hyperlipidemia     Hypertension, essential     Malignant tumor of prostate ([facility] [address] 2007    \"Seed Implants\"    Malignant tumor of urinary bladder ([facility] [address] 2011    \"Went 6 Times, They Put Some Kind Of Liquid In My Bladder\"    Other specified abnormal findings of blood chemistry     Raised prostate specific antigen     Shortness of breath on exertion     Thyroid nodule     Transient cerebral ischemia     Type 2 diabetes mellitus ([facility] [address]     Wears glasses        FAMILY HISTORY  Family History   Problem Relation Age of Onset    Heart Disease Mother         Heart Attack, Open Heart Surgery    Early Death Mother 58        Heart Attack    Heart Attack Mother     High Blood Pressure Mother     Dementia Father     Alzheimer's Disease Father     Cancer Sister         Cancer Unsure What Kind    Dementia Brother     Cancer Brother         Lymphoma       SOCIAL HISTORY  Social History     Socioeconomic History    Marital status:      Spouse name: None    Number of children: None    Years of education: None    Highest education level: None   Occupational History    None   Social Needs    Financial resource strain: None    Food insecurity     Worry: None     Inability: None    Transportation needs     Medical: None Non-medical: None   Tobacco Use    Smoking status: Former Smoker     Packs/day: 1.00     Years: 12.00     Pack years: 12.00     Types: Cigarettes, Pipe     Start date:      Last attempt to quit:      Years since quittin.5    Smokeless tobacco: Never Used   Substance and Sexual Activity    Alcohol use: No    Drug use: No    Sexual activity: Never   Lifestyle    Physical activity     Days per week: None     Minutes per session: None    Stress: None   Relationships    Social connections     Talks on phone: None     Gets together: None     Attends Restorationism service: None     Active member of club or organization: None     Attends meetings of clubs or organizations: None     Relationship status: None    Intimate partner violence     Fear of current or ex partner: None     Emotionally abused: None     Physically abused: None     Forced sexual activity: None   Other Topics Concern    None   Social History Narrative    None        SURGICAL HISTORY  Past Surgical History:   Procedure Laterality Date    CARDIAC CATHETERIZATION  2017    CORONARY ARTERY BYPASS GRAFT      DENTAL SURGERY      All Teeth Extracted In Past    EYE SURGERY Right     Laser For Glaucoma    HEMORRHOIDECTOMY   And     PROSTATE SURGERY      \"Seed Implants\" For Prostate Cancer                 CURRENT MEDICATIONS  Current Outpatient Medications   Medication Sig Dispense Refill    allopurinol (ZYLOPRIM) 100 MG tablet Take 1 tablet by mouth daily Patient unsure of dosage 90 tablet 1    clopidogrel (PLAVIX) 75 MG tablet Take 1 tablet by mouth daily 90 tablet 1    pantoprazole (PROTONIX) 40 MG tablet Take 1 tablet by mouth daily 90 tablet 1    metoprolol tartrate (LOPRESSOR) 25 MG tablet Take 1 tablet by mouth 2 times daily 180 tablet 1    amiodarone (CORDARONE) 200 MG tablet Take 1 tablet by mouth daily 90 tablet 1    bimatoprost (LUMIGAN) 0.01 % SOLN ophthalmic drops Place 1 drop into both eyes nightly      colchicine (COLCRYS) 0.6 MG tablet Take 0.6 mg by mouth 2 times daily      Dorzolamide HCl-Timolol Mal (COSOPT PF OP) Apply to eye every morning Right Eye      acetaminophen (TYLENOL) 325 MG tablet Take 2 tablets by mouth every 4 hours as needed for Pain (Patient not taking: Reported on 7/20/2020) 120 tablet 3     No current facility-administered medications for this visit. ALLERGIES  Allergies   Allergen Reactions    Lisinopril        PHYSICAL EXAM    /62 (Site: Right Upper Arm, Position: Sitting, Cuff Size: Large Adult)   Pulse 51   Temp 98.2 °F (36.8 °C)   Ht 6' 1\" (1.854 m)   Wt 242 lb (109.8 kg)   SpO2 98%   BMI 31.93 kg/m²     Physical Exam  Vitals signs and nursing note reviewed. Constitutional:       Appearance: Normal appearance. He is well-developed. He is not ill-appearing. HENT:      Head: Normocephalic and atraumatic. Nose: Nose normal.   Eyes:      Pupils: Pupils are equal, round, and reactive to light. Neck:      Musculoskeletal: Normal range of motion and neck supple. Cardiovascular:      Rate and Rhythm: Normal rate and regular rhythm. Heart sounds: Normal heart sounds. Pulmonary:      Effort: Pulmonary effort is normal.      Breath sounds: Normal breath sounds. Abdominal:      Palpations: Abdomen is soft. Musculoskeletal: Normal range of motion. Skin:     General: Skin is warm and dry. Neurological:      General: No focal deficit present. Mental Status: He is alert and oriented to person, place, and time. Mental status is at baseline. Cranial Nerves: No cranial nerve deficit. Psychiatric:         Mood and Affect: Mood normal.         Behavior: Behavior normal.         Thought Content: Thought content normal.         ASSESSMENT & PLAN     Diagnosis Orders   1. Iron deficiency anemia due to chronic blood loss  CBC   2.  Type 2 diabetes mellitus without complication, without long-term current use of insulin (HCC)  HEMOGLOBIN

## 2020-07-21 LAB
A/G RATIO: 1 (ref 1.1–2.2)
ALBUMIN SERPL-MCNC: 3.3 G/DL (ref 3.4–5)
ALP BLD-CCNC: 93 U/L (ref 40–129)
ALT SERPL-CCNC: 12 U/L (ref 10–40)
ANION GAP SERPL CALCULATED.3IONS-SCNC: 12 MMOL/L (ref 3–16)
AST SERPL-CCNC: 27 U/L (ref 15–37)
BILIRUB SERPL-MCNC: 0.3 MG/DL (ref 0–1)
BUN BLDV-MCNC: 20 MG/DL (ref 7–20)
CALCIUM SERPL-MCNC: 8.9 MG/DL (ref 8.3–10.6)
CHLORIDE BLD-SCNC: 106 MMOL/L (ref 99–110)
CO2: 21 MMOL/L (ref 21–32)
CREAT SERPL-MCNC: 1.2 MG/DL (ref 0.8–1.3)
ESTIMATED AVERAGE GLUCOSE: 151.3 MG/DL
GFR AFRICAN AMERICAN: >60
GFR NON-AFRICAN AMERICAN: 58
GLOBULIN: 3.3 G/DL
GLUCOSE BLD-MCNC: 128 MG/DL (ref 70–99)
HBA1C MFR BLD: 6.9 %
HCT VFR BLD CALC: 27.7 % (ref 40.5–52.5)
HEMOGLOBIN: 8.4 G/DL (ref 13.5–17.5)
MCH RBC QN AUTO: 23.9 PG (ref 26–34)
MCHC RBC AUTO-ENTMCNC: 30.2 G/DL (ref 31–36)
MCV RBC AUTO: 79.1 FL (ref 80–100)
PDW BLD-RTO: 18.8 % (ref 12.4–15.4)
PLATELET # BLD: 180 K/UL (ref 135–450)
PMV BLD AUTO: 8.2 FL (ref 5–10.5)
POTASSIUM SERPL-SCNC: 4 MMOL/L (ref 3.5–5.1)
RBC # BLD: 3.51 M/UL (ref 4.2–5.9)
SODIUM BLD-SCNC: 139 MMOL/L (ref 136–145)
TOTAL PROTEIN: 6.6 G/DL (ref 6.4–8.2)
WBC # BLD: 4.8 K/UL (ref 4–11)

## 2020-08-19 ENCOUNTER — HOSPITAL ENCOUNTER (OUTPATIENT)
Dept: LAB | Age: 85
Discharge: HOME | End: 2020-08-19
Payer: MEDICARE

## 2020-08-19 PROCEDURE — U0002 COVID-19 LAB TEST NON-CDC: HCPCS

## 2020-08-19 PROCEDURE — C9803 HOPD COVID-19 SPEC COLLECT: HCPCS

## 2020-08-20 LAB
SARS-COV-2: NOT DETECTED
SOURCE: NORMAL

## 2020-08-25 ENCOUNTER — HOSPITAL ENCOUNTER (OUTPATIENT)
Dept: INTERVENTIONAL RADIOLOGY/VASCULAR | Age: 85
Discharge: HOME | End: 2020-08-25
Payer: MEDICARE

## 2020-08-25 VITALS
HEART RATE: 59 BPM | HEIGHT: 73 IN | BODY MASS INDEX: 31.14 KG/M2 | WEIGHT: 235 LBS | RESPIRATION RATE: 13 BRPM | OXYGEN SATURATION: 100 % | SYSTOLIC BLOOD PRESSURE: 166 MMHG | DIASTOLIC BLOOD PRESSURE: 65 MMHG | TEMPERATURE: 96 F

## 2020-08-25 LAB
ANION GAP SERPL CALCULATED.3IONS-SCNC: 9 MMOL/L (ref 4–16)
APTT: 41.4 SECONDS (ref 25.1–37.1)
BUN BLDV-MCNC: 25 MG/DL (ref 6–23)
CALCIUM SERPL-MCNC: 9.6 MG/DL (ref 8.3–10.6)
CHLORIDE BLD-SCNC: 106 MMOL/L (ref 99–110)
CO2: 22 MMOL/L (ref 21–32)
CREAT SERPL-MCNC: 1.3 MG/DL (ref 0.9–1.3)
GFR AFRICAN AMERICAN: >60 ML/MIN/1.73M2
GFR NON-AFRICAN AMERICAN: 53 ML/MIN/1.73M2
GLUCOSE BLD-MCNC: 132 MG/DL (ref 70–99)
HCT VFR BLD CALC: 32.7 % (ref 42–52)
HEMOGLOBIN: 9.7 GM/DL (ref 13.5–18)
INR BLD: 1.06 INDEX
MCH RBC QN AUTO: 24.9 PG (ref 27–31)
MCHC RBC AUTO-ENTMCNC: 29.7 % (ref 32–36)
MCV RBC AUTO: 84.1 FL (ref 78–100)
PDW BLD-RTO: 19.3 % (ref 11.7–14.9)
PLATELET # BLD: 213 K/CU MM (ref 140–440)
PMV BLD AUTO: 9.9 FL (ref 7.5–11.1)
POTASSIUM SERPL-SCNC: 4.6 MMOL/L (ref 3.5–5.1)
PROTHROMBIN TIME: 12.8 SECONDS (ref 11.7–14.5)
RBC # BLD: 3.89 M/CU MM (ref 4.6–6.2)
SODIUM BLD-SCNC: 137 MMOL/L (ref 135–145)
WBC # BLD: 6.3 K/CU MM (ref 4–10.5)

## 2020-08-25 PROCEDURE — 85610 PROTHROMBIN TIME: CPT

## 2020-08-25 PROCEDURE — C1887 CATHETER, GUIDING: HCPCS

## 2020-08-25 PROCEDURE — 6360000002 HC RX W HCPCS: Performed by: RADIOLOGY

## 2020-08-25 PROCEDURE — 80048 BASIC METABOLIC PNL TOTAL CA: CPT

## 2020-08-25 PROCEDURE — 36222 PLACE CATH CAROTID/INOM ART: CPT

## 2020-08-25 PROCEDURE — 85027 COMPLETE CBC AUTOMATED: CPT

## 2020-08-25 PROCEDURE — 6360000004 HC RX CONTRAST MEDICATION: Performed by: INTERNAL MEDICINE

## 2020-08-25 PROCEDURE — 2709999900 HC NON-CHARGEABLE SUPPLY

## 2020-08-25 PROCEDURE — C1894 INTRO/SHEATH, NON-LASER: HCPCS

## 2020-08-25 PROCEDURE — 85730 THROMBOPLASTIN TIME PARTIAL: CPT

## 2020-08-25 PROCEDURE — C1769 GUIDE WIRE: HCPCS

## 2020-08-25 RX ORDER — ROSUVASTATIN CALCIUM 10 MG/1
10 TABLET, COATED ORAL DAILY
COMMUNITY

## 2020-08-25 RX ORDER — FUROSEMIDE 20 MG/1
20 TABLET ORAL DAILY
COMMUNITY
End: 2021-02-23 | Stop reason: SDUPTHER

## 2020-08-25 RX ORDER — METOPROLOL SUCCINATE 25 MG/1
25 TABLET, EXTENDED RELEASE ORAL DAILY
COMMUNITY

## 2020-08-25 RX ORDER — MIDAZOLAM HYDROCHLORIDE 1 MG/ML
1 INJECTION INTRAMUSCULAR; INTRAVENOUS ONCE
Status: COMPLETED | OUTPATIENT
Start: 2020-08-25 | End: 2020-08-25

## 2020-08-25 RX ORDER — SODIUM CHLORIDE 0.9 % (FLUSH) 0.9 %
10 SYRINGE (ML) INJECTION PRN
Status: DISCONTINUED | OUTPATIENT
Start: 2020-08-25 | End: 2020-08-26 | Stop reason: HOSPADM

## 2020-08-25 RX ORDER — FENTANYL CITRATE 50 UG/ML
50 INJECTION, SOLUTION INTRAMUSCULAR; INTRAVENOUS ONCE
Status: COMPLETED | OUTPATIENT
Start: 2020-08-25 | End: 2020-08-25

## 2020-08-25 RX ORDER — ACETAMINOPHEN 325 MG/1
650 TABLET ORAL EVERY 4 HOURS PRN
Status: DISCONTINUED | OUTPATIENT
Start: 2020-08-25 | End: 2020-08-26 | Stop reason: HOSPADM

## 2020-08-25 RX ORDER — IODIXANOL 320 MG/ML
65 INJECTION, SOLUTION INTRAVASCULAR
Status: COMPLETED | OUTPATIENT
Start: 2020-08-25 | End: 2020-08-25

## 2020-08-25 RX ADMIN — MIDAZOLAM 1 MG: 1 INJECTION INTRAMUSCULAR; INTRAVENOUS at 08:51

## 2020-08-25 RX ADMIN — IODIXANOL 65 ML: 320 INJECTION, SOLUTION INTRAVASCULAR at 09:22

## 2020-08-25 RX ADMIN — FENTANYL CITRATE 50 MCG: 50 INJECTION INTRAMUSCULAR; INTRAVENOUS at 09:32

## 2020-08-25 RX ADMIN — FENTANYL CITRATE 50 MCG: 50 INJECTION INTRAMUSCULAR; INTRAVENOUS at 08:51

## 2020-08-25 ASSESSMENT — PAIN SCALES - GENERAL
PAINLEVEL_OUTOF10: 3
PAINLEVEL_OUTOF10: 2

## 2020-08-25 NOTE — PROGRESS NOTES
Pt admitted to cath lab holding area room 16; no family at bedside.  Emergency contact information confirmed

## 2020-08-25 NOTE — H&P
History Obtained From:  Patient    HISTORY OF PRESENT ILLNESS:      The patient is a 80 y.o. male with significant past medical history of  Symptomatic left ICA severe stenosis here for carotid angiogram.      Past Medical History:    Severe left ICA stenosis    Allergies:  Lisinopril     REVIEW OF SYSTEMS:  +dizziness and weakness off an on    PHYSICAL EXAM:    Vitals:  BP (!) 152/66   Pulse (!) 46   Temp 96 °F (35.6 °C) (Temporal)   Resp (!) 1   Ht 6' 1\" (1.854 m)   Wt 235 lb (106.6 kg)   SpO2 95%   BMI 31.00 kg/m²       Elderly male in nad resting on bed  AAOx3 Intact Grossly    ASSESSMENT AND PLAN:      Carotid angiogram.  Consent obtained. Risks and benefits explained.

## 2020-08-25 NOTE — FLOWSHEET NOTE
Pt transported to 2130 in cart/on monitor per RN.  Right radial site free of bleeding/hematoma at time of transfer

## 2020-08-25 NOTE — PROGRESS NOTES
Procedure: Fluoro guided bilateral Carotid Angiogram with Dr. Amilcar Burgess IR.  5 Fr RCFA. BLE pulses doppler. Sedation: Yes    Outcome: Arch, bilateral carotid artery angiogram. No s/s of contrast reaction. Post orders received. RLE 5 Fr. Sheath pulled at 0900 digital pressure held x 15 mins by RT, small hematoma formed,  Dr. Del Gomez continues with digital pressure to right groin. Hematoma marked. DSD to site. BLE pulses still doppler. Released to recovery by physician. Transported to post op recovery. Report given at bedside.     Fredo OTT IR

## 2020-08-25 NOTE — FLOWSHEET NOTE
Pt to pt  in wheelchair per RN for d/c home in private vehicle with family.  Right groin free of bleeding/hematoma at time of d/c

## 2020-08-25 NOTE — PROGRESS NOTES
Received from IR lab. Monitor and alarms on. Call light within reach. Right groin access site with large grapefruit size hematoma noted extending to right hip, edges marked. Hematoma occurred in IR during sheath pull per report from [facility] in IR. Pulses palpable. Nallely Caballero also reported that Dr Jb Bennett held pressure on hematoma in IR department.

## 2020-08-25 NOTE — PLAN OF CARE
All discharge instructions explained to patient and also spoke with patients daughter Helga Puckett over the phone in regards to discharge instructions. Patient was walked throughout the unit and tolerated well. Right groin assessed and no bleeding noted. Patients right groin remains marked and no additional bleeding noted and remains soft. IV removed per discharge orders. Patient will discharge at 1530 per discharge orders. Vitals remain stable.  Elizabeth Becerril RN 8/25/2020

## 2020-08-26 NOTE — PROGRESS NOTES
Called patient this AM.  Patient doing well w/o any complaints. Patient said he went to the bathroom for urination w/o any difficulties and is walking normally w/o any pain at right groin site. Instructed patient to hold off on any heavy lifting for 3-5 days and resume normal ADL's.

## 2020-08-27 ENCOUNTER — APPOINTMENT (OUTPATIENT)
Dept: ULTRASOUND IMAGING | Age: 85
End: 2020-08-27
Payer: MEDICARE

## 2020-08-27 ENCOUNTER — TELEPHONE (OUTPATIENT)
Dept: FAMILY MEDICINE CLINIC | Age: 85
End: 2020-08-27

## 2020-08-27 ENCOUNTER — HOSPITAL ENCOUNTER (EMERGENCY)
Age: 85
Discharge: HOME | End: 2020-08-27
Payer: MEDICARE

## 2020-08-27 VITALS
BODY MASS INDEX: 31.14 KG/M2 | OXYGEN SATURATION: 100 % | HEART RATE: 70 BPM | HEIGHT: 73 IN | SYSTOLIC BLOOD PRESSURE: 184 MMHG | WEIGHT: 235 LBS | RESPIRATION RATE: 16 BRPM | DIASTOLIC BLOOD PRESSURE: 81 MMHG | TEMPERATURE: 97.7 F

## 2020-08-27 LAB
ALBUMIN SERPL-MCNC: 3.3 GM/DL (ref 3.4–5)
ALP BLD-CCNC: 79 IU/L (ref 40–129)
ALT SERPL-CCNC: 12 U/L (ref 10–40)
ANION GAP SERPL CALCULATED.3IONS-SCNC: 10 MMOL/L (ref 4–16)
APTT: 44.5 SECONDS (ref 25.1–37.1)
AST SERPL-CCNC: 23 IU/L (ref 15–37)
BACTERIA: NEGATIVE /HPF
BASOPHILS ABSOLUTE: 0.1 K/CU MM
BASOPHILS RELATIVE PERCENT: 0.9 % (ref 0–1)
BILIRUB SERPL-MCNC: 0.5 MG/DL (ref 0–1)
BILIRUBIN URINE: NEGATIVE MG/DL
BLOOD, URINE: NEGATIVE
BUN BLDV-MCNC: 18 MG/DL (ref 6–23)
CALCIUM SERPL-MCNC: 9.1 MG/DL (ref 8.3–10.6)
CHLORIDE BLD-SCNC: 105 MMOL/L (ref 99–110)
CLARITY: CLEAR
CO2: 23 MMOL/L (ref 21–32)
COLOR: ABNORMAL
CREAT SERPL-MCNC: 1.1 MG/DL (ref 0.9–1.3)
DIFFERENTIAL TYPE: ABNORMAL
EOSINOPHILS ABSOLUTE: 0.1 K/CU MM
EOSINOPHILS RELATIVE PERCENT: 1.9 % (ref 0–3)
GFR AFRICAN AMERICAN: >60 ML/MIN/1.73M2
GFR NON-AFRICAN AMERICAN: >60 ML/MIN/1.73M2
GLUCOSE BLD-MCNC: 112 MG/DL (ref 70–99)
GLUCOSE, URINE: NEGATIVE MG/DL
HCT VFR BLD CALC: 28.5 % (ref 42–52)
HEMOGLOBIN: 8.4 GM/DL (ref 13.5–18)
IMMATURE NEUTROPHIL %: 0.4 % (ref 0–0.43)
INR BLD: 1.16 INDEX
KETONES, URINE: NEGATIVE MG/DL
LEUKOCYTE ESTERASE, URINE: NEGATIVE
LYMPHOCYTES ABSOLUTE: 1.5 K/CU MM
LYMPHOCYTES RELATIVE PERCENT: 27.3 % (ref 24–44)
MCH RBC QN AUTO: 25.1 PG (ref 27–31)
MCHC RBC AUTO-ENTMCNC: 29.5 % (ref 32–36)
MCV RBC AUTO: 85.1 FL (ref 78–100)
MONOCYTES ABSOLUTE: 0.6 K/CU MM
MONOCYTES RELATIVE PERCENT: 10.2 % (ref 0–4)
MUCUS: ABNORMAL HPF
NITRITE URINE, QUANTITATIVE: NEGATIVE
NUCLEATED RBC %: 0 %
PDW BLD-RTO: 19.5 % (ref 11.7–14.9)
PH, URINE: 6 (ref 5–8)
PLATELET # BLD: 153 K/CU MM (ref 140–440)
PMV BLD AUTO: 10.1 FL (ref 7.5–11.1)
POTASSIUM SERPL-SCNC: 4.1 MMOL/L (ref 3.5–5.1)
PROTEIN UA: 30 MG/DL
PROTHROMBIN TIME: 14 SECONDS (ref 11.7–14.5)
RBC # BLD: 3.35 M/CU MM (ref 4.6–6.2)
RBC URINE: <1 /HPF (ref 0–3)
SEGMENTED NEUTROPHILS ABSOLUTE COUNT: 3.2 K/CU MM
SEGMENTED NEUTROPHILS RELATIVE PERCENT: 59.3 % (ref 36–66)
SODIUM BLD-SCNC: 138 MMOL/L (ref 135–145)
SPECIFIC GRAVITY UA: 1.01 (ref 1–1.03)
SQUAMOUS EPITHELIAL: <1 /HPF
TOTAL IMMATURE NEUTOROPHIL: 0.02 K/CU MM
TOTAL NUCLEATED RBC: 0 K/CU MM
TOTAL PROTEIN: 6.6 GM/DL (ref 6.4–8.2)
UROBILINOGEN, URINE: NORMAL MG/DL (ref 0.2–1)
WBC # BLD: 5.4 K/CU MM (ref 4–10.5)
WBC UA: 1 /HPF (ref 0–2)

## 2020-08-27 PROCEDURE — 99284 EMERGENCY DEPT VISIT MOD MDM: CPT

## 2020-08-27 PROCEDURE — 93926 LOWER EXTREMITY STUDY: CPT

## 2020-08-27 PROCEDURE — 85610 PROTHROMBIN TIME: CPT

## 2020-08-27 PROCEDURE — 85730 THROMBOPLASTIN TIME PARTIAL: CPT

## 2020-08-27 PROCEDURE — 76870 US EXAM SCROTUM: CPT

## 2020-08-27 PROCEDURE — 85025 COMPLETE CBC W/AUTO DIFF WBC: CPT

## 2020-08-27 PROCEDURE — 81001 URINALYSIS AUTO W/SCOPE: CPT

## 2020-08-27 PROCEDURE — 93975 VASCULAR STUDY: CPT

## 2020-08-27 PROCEDURE — 80053 COMPREHEN METABOLIC PANEL: CPT

## 2020-08-27 PROCEDURE — 4500000027

## 2020-08-27 PROCEDURE — 36415 COLL VENOUS BLD VENIPUNCTURE: CPT

## 2020-08-27 NOTE — ED PROVIDER NOTES
eMERGENCY dEPARTMENT eNCOUnter      PCP: Inge David MD    CHIEF COMPLAINT    Chief Complaint   Patient presents with    Other     pt states that he had a cardiac cath done on Tuesday morning and states that is having problems at the site with swelling; pt states that his penis is black; pt states that he is having trouble urinating     Pt was not seen by physician    HPI    Saba Cardoza is a 80 y.o. male who presents with right inguinal penile and scrotal bruising pain and swelling. Patient had carotid angiogram done Tuesday with interventional radiology, Dr. Juan Francisco Devries. He did stop his Eliquis and Plavix 2 days prior to the procedure but resumed them the next day. He states that the same day of the procedure he began to have pain and swelling. States that his pain is \"turned black\". He does state that he is uncircumcised and has been unable to retract the foreskin. He is able to urinate and feels as if he is emptying his bladder but he states that \"sprays everywhere\". He does report history of prostate cancer. States he does have some testicular pain. Denies any significant acute changes in this. Denies fevers.         REVIEW OF SYSTEMS    Constitutional:  Denies fever, chills, weight loss or weakness   HENT:  Denies sore throat or ear pain   Cardiovascular:  Denies chest pain, palpitations   Respiratory:  Denies cough or shortness of breath    GI:  Denies abdominal pain, nausea, vomiting, or diarrhea  :  See hpi  Musculoskeletal:  Denies back pain,   Skin:  Denies rash  Neurologic:  Denies headache, focal weakness or sensory changes   Endocrine:  Denies polyuria or polydypsia   Lymphatic:  Denies swollen glands     All other review of systems are negative  See HPI and nursing notes for additional information     PAST MEDICAL AND SURGICAL HISTORY    Past Medical History:   Diagnosis Date    Acid reflux     CAD (coronary artery disease)     Sees Dr. Shawanda Haque    Cerebral artery occlusion Lymphocytes % 27.3 24 - 44 %    Monocytes % 10.2 (H) 0 - 4 %    Eosinophils % 1.9 0 - 3 %    Basophils % 0.9 0 - 1 %    Segs Absolute 3.2 K/CU MM    Lymphocytes Absolute 1.5 K/CU MM    Monocytes Absolute 0.6 K/CU MM    Eosinophils Absolute 0.1 K/CU MM    Basophils Absolute 0.1 K/CU MM    Nucleated RBC % 0.0 %    Total Nucleated RBC 0.0 K/CU MM    Total Immature Neutrophil 0.02 K/CU MM    Immature Neutrophil % 0.4 0 - 0.43 %   CMP   Result Value Ref Range    Sodium 138 135 - 145 MMOL/L    Potassium 4.1 3.5 - 5.1 MMOL/L    Chloride 105 99 - 110 mMol/L    CO2 23 21 - 32 MMOL/L    BUN 18 6 - 23 MG/DL    CREATININE 1.1 0.9 - 1.3 MG/DL    Glucose 112 (H) 70 - 99 MG/DL    Calcium 9.1 8.3 - 10.6 MG/DL    Alb 3.3 (L) 3.4 - 5.0 GM/DL    Total Protein 6.6 6.4 - 8.2 GM/DL    Total Bilirubin 0.5 0.0 - 1.0 MG/DL    ALT 12 10 - 40 U/L    AST 23 15 - 37 IU/L    Alkaline Phosphatase 79 40 - 129 IU/L    GFR Non-African American >60 >60 mL/min/1.73m2    GFR African American >60 >60 mL/min/1.73m2    Anion Gap 10 4 - 16   PT/INR   Result Value Ref Range    Protime 14.0 11.7 - 14.5 SECONDS    INR 1.16 INDEX   PTT   Result Value Ref Range    aPTT 44.5 (H) 25.1 - 37.1 SECONDS   Urinalysis   Result Value Ref Range    Color, UA JANET (A) YELLOW    Clarity, UA CLEAR CLEAR    Glucose, Urine NEGATIVE NEGATIVE MG/DL    Bilirubin Urine NEGATIVE NEGATIVE MG/DL    Ketones, Urine NEGATIVE NEGATIVE MG/DL    Specific Gravity, UA 1.010 1.001 - 1.035    Blood, Urine NEGATIVE NEGATIVE    pH, Urine 6.0 5.0 - 8.0    Protein, UA 30 (A) NEGATIVE MG/DL    Urobilinogen, Urine NORMAL 0.2 - 1.0 MG/DL    Nitrite Urine, Quantitative NEGATIVE NEGATIVE    Leukocyte Esterase, Urine NEGATIVE NEGATIVE    RBC, UA <1 0 - 3 /HPF    WBC, UA 1 0 - 2 /HPF    Bacteria, UA NEGATIVE NEGATIVE /HPF    Squam Epithel, UA <1 /HPF    Mucus, UA RARE (A) NEGATIVE HPF       RADIOLOGY    Us Scrotum And Testicles    Result Date: 8/27/2020  EXAMINATION: ULTRASOUND OF THE SCROTUM/TESTICLES WITH COLOR DOPPLER FLOW EVALUATION; DOPPLER EVALUATION OF THE PELVIS 8/27/2020 COMPARISON: None. HISTORY: ORDERING SYSTEM PROVIDED HISTORY: pt in ed TECHNOLOGIST PROVIDED HISTORY: Reason for exam:->pt in ed Acuity: Acute FINDINGS: Measurements: Right testicle: 3.1 x 1.6 x 2.8 cm Left testicle: 3.2 x 1.5 x 2.0 cm Right: Grey scale: The right testicle demonstrates normal homogeneous echotexture without focal lesion. No evidence of testicular microlithiasis. Doppler Evaluation:  There is normal arterial and venous Doppler flow within the testicle. Scrotal Sac:  Mild hydrocele. Epididymis:  No acute abnormality. Left: Grey scale:  Heterogeneous echotexture. There is a focal calcification in the testis. Doppler Evaluation:  There is normal arterial and venous Doppler flow within the testicle. Scrotal Sac: Moderate left varicocele. A vein measures up to 6 mm and has internal echogenic material with no vascularity suspicious for a thrombosed varicocele. Epididymis:  No acute abnormality. Moderate left varicocele with findings suggestive of a thrombosed varicocele. There is echogenic material within a vein without internal vascularity. Normal color Doppler flow is seen in both testes. The left testis has a heterogeneous echotexture and contains a calcification but is otherwise unremarkable. Mild right hydrocele. Vl Dup Lower Extremity Arteries Right    Result Date: 8/27/2020  EXAMINATION: ARTERIAL DUPLEX ULTRASOUND OF THE  LOWER EXTREMITY  8/27/2020 6:23 pm TECHNIQUE: Duplex ultrasound and Doppler images were obtained of the lower extremity. COMPARISON: CT pelvis December 20, 2019. No prior vascular ultrasound. HISTORY: ORDERING SYSTEM PROVIDED HISTORY: Recent cath, r/o pseudoaneurysm TECHNOLOGIST PROVIDED HISTORY: Reason for recent cath, r/o pseudoaneurysm Acuity: Acute Type of Exam: Initial FINDINGS: Dedicated ultrasound was performed in the right groin.   Right common femoral artery demonstrates normal biphasic arterial waveforms. No contour abnormality identified to suggest pseudoaneurysm. Visualized common femoral vein is also unremarkable. Incidental note is made of 1.2 cm right lymph node which is unremarkable on sonographic evaluation. No evidence of pseudoaneurysm or acute process. Us Dup Abd Pel Retro Scrot Complete    Result Date: 8/27/2020  EXAMINATION: ULTRASOUND OF THE SCROTUM/TESTICLES WITH COLOR DOPPLER FLOW EVALUATION; DOPPLER EVALUATION OF THE PELVIS 8/27/2020 COMPARISON: None. HISTORY: ORDERING SYSTEM PROVIDED HISTORY: pt in ed TECHNOLOGIST PROVIDED HISTORY: Reason for exam:->pt in ed Acuity: Acute FINDINGS: Measurements: Right testicle: 3.1 x 1.6 x 2.8 cm Left testicle: 3.2 x 1.5 x 2.0 cm Right: Grey scale: The right testicle demonstrates normal homogeneous echotexture without focal lesion. No evidence of testicular microlithiasis. Doppler Evaluation:  There is normal arterial and venous Doppler flow within the testicle. Scrotal Sac:  Mild hydrocele. Epididymis:  No acute abnormality. Left: Grey scale:  Heterogeneous echotexture. There is a focal calcification in the testis. Doppler Evaluation:  There is normal arterial and venous Doppler flow within the testicle. Scrotal Sac: Moderate left varicocele. A vein measures up to 6 mm and has internal echogenic material with no vascularity suspicious for a thrombosed varicocele. Epididymis:  No acute abnormality. Moderate left varicocele with findings suggestive of a thrombosed varicocele. There is echogenic material within a vein without internal vascularity. Normal color Doppler flow is seen in both testes. The left testis has a heterogeneous echotexture and contains a calcification but is otherwise unremarkable. Mild right hydrocele. Ir Arch Sharlot Cheek Car Cerv Cerebral    Result Date: 8/26/2020  PROCEDURE: SELECTIVE SECOND ORDER CATHETERIZATION RIGHT COMMON CAROTID ARTERY WITH RIGHT CAROTID ARTERIOGRAPHY.  SELECTIVE FIRST ORDER CATHETERIZATION LEFT COMMON CAROTID ARTERY WITH LEFT CAROTID ARTERIOGRAPHY. MODERATE CONSCIOUS SEDATION 8/25/2020 HISTORY: ORDERING SYSTEM PROVIDED HISTORY: Occlusion of left carotid artery TECHNOLOGIST PROVIDED HISTORY: Reason for exam:->Bilateral Carotid Angiogram SEDATION: 1 mg of intravenous Versed and 50 mcg of intravenous fentanyl was administered and monitored by the radiology nursing staff RN [facility] and supervised by Dr. Isai Welch. CONTRAST: 65 cc of Visipaque 320. FLUOROSCOPY DOSE AND TYPE OR TIME AND EXPOSURES: 4.4 minutes with 17 fluoroscopic images/runs saved.  TECHNIQUE: Informed consent was obtained after a detailed explanation of the procedure including the risks, benefits, and alternatives. Universal protocol was observed. The right groin was prepped and draped in sterile fashion and local anesthesia was achieved with lidocaine. A 21 gauge micropuncture needle was used to access the right common femoral artery under ultrasound guidance. A 0.035 guidewire was used to place a 5 Western Beatriz vascular sheath. Through the 5 Occitan sheath a 5 Occitan pigtail catheter was advanced over a 0.035 guidewire to the level of the aortic arch. An aortogram was performed. The catheter then switched for a 5 Western Beatriz  catheter was used to select the right common carotid artery. An angiogram was performed. Afterwards the catheter advanced over a 0.035 guidewire to the left common carotid artery. An angiogram was performed. Afterwards the catheter removed and the sheath removed and manual compression performed over the right groin for approximately 15 minutes by RT Araujo. There was a small right groin hematoma noted post compression. Ultrasound examination did not show any active pseudoaneurysm. A sterile dressing was applied. There is no bleeding noted. The patient then transferred to the recovery area for further monitoring. In the recovery area there was no expansion of the hematoma.  Patient without any complaints. FINDINGS: Thoracic aortogram which did not show any significant stenosis seen in the proximal great vessels. Right common carotid artery angiogram which did not show any significant stenosis or occlusions. Left common carotid artery angiogram which demonstrated an approximate 51% stenosis seen in the proximal left internal carotid artery. Approximate 51% stenosis seen in the proximal left internal carotid artery as above. ED COURSE & MEDICAL DECISION MAKING       Vital signs and nursing notes reviewed during ED course. Patient seen independently. Attending available throughout ED stay for consultation. . All pertinent Lab data and radiographic results reviewed with patient at bedside. The patient and/or the family were informed of the results of any tests/labs/imaging, the treatment plan, and time was allotted to answer questions. Clinical  IMPRESSION    1. Postoperative ecchymosis        Patient presents as above. He does have significant bruising hematoma to the right proximal thigh, inguinal region. The entire penile shaft is bruised. I am able to retract the foreskin and replace it with ease. There is no scrotal erythema. No scrotal crepitus. No significant reproducible tenderness to palpation of the testicles  Patient has no retention and is urinating and emptying bladder. 20 cc on postvoid bladder scan. Patient is ambulating here without complication. Ultrasound without any evidence of pseudoaneurysm. There is a thrombosed varicocele. I did speak to patient's daughter Sophia Villanueva and updated her. Also spoke to Dr. Loy Piedra through [facility] and he is able to follow-up with the patient for recheck of the area as he did refer patient for the procedure. Patient educated that this bruise will take some time to disappear. Educated on signs symptoms to consider returning to the ED.   He verbalized understanding and agreement with the plan of care.      Comment: Please note this report has been produced using speech recognition software and may contain errors related to that system including errors in grammar, punctuation, and spelling, as well as words and phrases that may be inappropriate. If there are any questions or concerns please feel free to contact the dictating provider for clarification.        Iraj Ram PA-C  09/05/20 [id number]

## 2020-08-27 NOTE — ED PROVIDER NOTES
As physician-in-triage, I performed a medical screening history and physical exam on this patient. HISTORY OF PRESENT ILLNESS  Mayra Anton is a 80 y.o. male with penis that is swollen and dark in color after having a carotid artery procedure by IR that went in through his right groin on Tuesday. He is having difficulty urinating due to the abnormalities in his penis. He states he is uncircumcised. No fevers. PHYSICAL EXAM  BP (!) 166/91   Pulse 80   Temp 97.7 °F (36.5 °C) (Oral)   Resp 15   Ht 6' 1\" (1.854 m)   Wt 235 lb (106.6 kg)   SpO2 97%   BMI 31.00 kg/m²     On exam, the patient appears in no acute distress. Speech is clear. Breathing is unlabored. Moves all extremities    Comment: Please note this report has been produced using speech recognition software and may contain errors related to that system including errors in grammar, punctuation, and spelling, as well as words and phrases that may be inappropriate. If there are any questions or concerns please feel free to contact the dictating provider for clarification.         Yanet Jenkins MD  08/27/20 Jania Chavez MD  08/27/20 [id number]

## 2020-08-27 NOTE — TELEPHONE ENCOUNTER
pls let him know that dr. Hazel Carballo is out today and if her is having swelling in that area, he needs to go to the ED or urgent care for evaluation.

## 2020-08-27 NOTE — TELEPHONE ENCOUNTER
WOULD REALLY LIKE DR MADRIGAL TO CALL HIM. HAD SOME SURGERY(?) AND HIS PRIVATE AREA IS SWELLED UP LIKE A TENNIS BALL. THAT'S ALL I COULD GET OUT OF HIM.

## 2020-08-27 NOTE — TELEPHONE ENCOUNTER
Called pt to let him know that Dr. Fide Moore is out today and per [facility] if he's having swelling in that area, he needs to go the ED or urgent care for evaluation. Patient verbalized understanding.

## 2020-08-28 ENCOUNTER — OFFICE VISIT (OUTPATIENT)
Dept: FAMILY MEDICINE CLINIC | Age: 85
End: 2020-08-28
Payer: MEDICARE

## 2020-08-28 VITALS
WEIGHT: 235.6 LBS | DIASTOLIC BLOOD PRESSURE: 58 MMHG | SYSTOLIC BLOOD PRESSURE: 115 MMHG | HEIGHT: 73 IN | OXYGEN SATURATION: 98 % | BODY MASS INDEX: 31.23 KG/M2 | TEMPERATURE: 97.2 F | HEART RATE: 74 BPM

## 2020-08-28 PROCEDURE — 1111F DSCHRG MED/CURRENT MED MERGE: CPT | Performed by: PHYSICIAN ASSISTANT

## 2020-08-28 PROCEDURE — 99214 OFFICE O/P EST MOD 30 MIN: CPT | Performed by: PHYSICIAN ASSISTANT

## 2020-08-28 ASSESSMENT — ENCOUNTER SYMPTOMS
SORE THROAT: 0
SHORTNESS OF BREATH: 0
DIARRHEA: 0
BLOOD IN STOOL: 0
ABDOMINAL PAIN: 0
EYE PAIN: 0
COUGH: 0
RHINORRHEA: 0
NAUSEA: 0
VOMITING: 0
CONSTIPATION: 0

## 2020-08-28 NOTE — PROGRESS NOTES
8/28/2020    [facility]    Chief Complaint   Patient presents with    Follow-Up from Hospital     Pt stated that he is not feeling too bad today. no c/o       HPI  History obtained from the patient. Benjamin aCmpos is a 80 y.o. male who presents today for ED follow up. The patient presented to the ED yesterday 8/27/20 for postoperative ecchymosis. The patient's penis was swollen and darkened following a carotid artery procedure with IR on Tuesday 8/25/20. Pt states that they had trouble getting him to stop bleeding following the procedure, but they did, and he was discharged same day. He is on Eliquis but this was held 2 days prior to his procedure. Rachael Rawls just told me to take it easy for a couple days,\" at discharge following the procedure. The patient became concerned with extensive bruising and went to the ED Thursday night 8/27/20 for evaluation. Ultrasound studies of the right LE arteries showed no pseudoaneurysm or acute process. US of scrotum and testicles showed moderate left varicocele with findings suggestive of a thrombosed varicocele. \"They told me it [the bruising] would last 3-4 more weeks. \" The patient states that this morning, the swelling has significantly improved. Bruising is still the same. Denies pain. The patient states that a couple weeks before the procedure, his cardiologist ordered a carotid ultrasound, and when he went in to get this done, the ultrasound technician pushed the US probe on this neck really hard. The patient states that since this ultrasound, he started having spells of dizziness and lightheadedness, even passing out at times. He had none of these symptoms before the ultrasound. He has not had a dizzy spell since the procedure, but he states that he has been feeling tired and weak. REVIEW OF SYMPTOMS  Review of Systems   Constitutional: Negative for chills and fever. HENT: Negative for ear pain, rhinorrhea and sore throat.     Eyes: Negative for pain and visual disturbance. Respiratory: Negative for cough and shortness of breath. Cardiovascular: Negative for chest pain and palpitations. Gastrointestinal: Negative for abdominal pain, blood in stool, constipation, diarrhea, nausea and vomiting. Genitourinary: Negative for dysuria, frequency and urgency. Skin: Negative for rash. Neurological: Positive for dizziness. Negative for syncope. Admits dizzy spells. States that these started after he got a ultrasound, which the technician put a lot of pressure on his neck. Passed out at [employer] once. Psychiatric/Behavioral: Negative for suicidal ideas. The patient is not nervous/anxious.         PAST MEDICAL HISTORY  Past Medical History:   Diagnosis Date    Acid reflux     CAD (coronary artery disease)     Sees Dr. Lisandra Parks Cerebral artery occlusion with cerebral infarction ([address] 2016    No Residual     Cirrhosis, non-alcoholic (HCC)     Constipation     Coronary atherosclerosis     Mk hematuria     Full dentures     Gallstone     Glaucoma     Bilateral Eyes    Gout Last Episode 2014    \"Right Big Toe, Left Elbow\"    Hepatitis Dx 1960's    \"I Just Got Over It\"    Hx of malignant neoplasm of prostate     Hyperlipidemia     Hypertension, essential     Malignant tumor of prostate ([address] 2007    \"Seed Implants\"    Malignant tumor of urinary bladder ([address] 2011    \"Went 6 Times, They Put Some Kind Of Liquid In My Bladder\"    Other specified abnormal findings of blood chemistry     Raised prostate specific antigen     Shortness of breath on exertion     Thyroid nodule     Transient cerebral ischemia     Type 2 diabetes mellitus ([address]     Wears glasses        FAMILY HISTORY  Family History   Problem Relation Age of Onset    Heart Disease Mother         Heart Attack, Open Heart Surgery    Early Death Mother 58        Heart Attack    Heart Attack Mother     High Blood Pressure Mother     Dementia Father     Alzheimer's Disease Father     Cancer Sister         Cancer Unsure What Kind    Dementia Brother     Cancer Brother         Lymphoma       SOCIAL HISTORY  Social History     Socioeconomic History    Marital status:      Spouse name: None    Number of children: None    Years of education: None    Highest education level: None   Occupational History    None   Social Needs    Financial resource strain: None    Food insecurity     Worry: None     Inability: None    Transportation needs     Medical: None     Non-medical: None   Tobacco Use    Smoking status: Former Smoker     Packs/day: 1.00     Years: 12.00     Pack years: 12.00     Types: Cigarettes, Pipe     Start date:      Last attempt to quit:      Years since quittin.6    Smokeless tobacco: Never Used   Substance and Sexual Activity    Alcohol use: No    Drug use: No    Sexual activity: Never   Lifestyle    Physical activity     Days per week: None     Minutes per session: None    Stress: None   Relationships    Social connections     Talks on phone: None     Gets together: None     Attends Hindu service: None     Active member of club or organization: None     Attends meetings of clubs or organizations: None     Relationship status: None    Intimate partner violence     Fear of current or ex partner: None     Emotionally abused: None     Physically abused: None     Forced sexual activity: None   Other Topics Concern    None   Social History Narrative    None        SURGICAL HISTORY  Past Surgical History:   Procedure Laterality Date    CARDIAC CATHETERIZATION  2017    CORONARY ARTERY BYPASS GRAFT      DENTAL SURGERY      All Teeth Extracted In Past    EYE SURGERY Right     Laser For Glaucoma    HEMORRHOIDECTOMY   And     PROSTATE SURGERY      \"Seed Implants\" For Prostate Cancer       CURRENT MEDICATIONS  Current Outpatient Medications   Medication Sig Dispense Refill    furosemide (LASIX) 20 MG tablet Take 20 mg by mouth daily      rosuvastatin (CRESTOR) 10 MG tablet Take 10 mg by mouth daily      metoprolol succinate (TOPROL XL) 25 MG extended release tablet Take 25 mg by mouth daily      allopurinol (ZYLOPRIM) 100 MG tablet Take 1 tablet by mouth daily Patient unsure of dosage 90 tablet 1    clopidogrel (PLAVIX) 75 MG tablet Take 1 tablet by mouth daily 90 tablet 1    pantoprazole (PROTONIX) 40 MG tablet Take 1 tablet by mouth daily 90 tablet 1    amiodarone (CORDARONE) 200 MG tablet Take 1 tablet by mouth daily 90 tablet 1    bimatoprost (LUMIGAN) 0.01 % SOLN ophthalmic drops Place 1 drop into both eyes nightly      colchicine (COLCRYS) 0.6 MG tablet Take 0.6 mg by mouth 2 times daily      Dorzolamide HCl-Timolol Mal (COSOPT PF OP) Apply to eye every morning Right Eye      acetaminophen (TYLENOL) 325 MG tablet Take 2 tablets by mouth every 4 hours as needed for Pain (Patient not taking: Reported on 8/28/2020) 120 tablet 3     No current facility-administered medications for this visit. ALLERGIES  Allergies   Allergen Reactions    Lisinopril        RECENT LABS    Lab Results   Component Value Date    LABA1C 6.9 07/20/2020     Lab Results   Component Value Date    .3 07/20/2020       Lab Results   Component Value Date    CHOL 179 10/01/2019    CHOL 200 (H) 04/02/2019     Lab Results   Component Value Date    LDLCALC 122 (H) 10/01/2019    LDLCALC 143 (H) 04/02/2019       Lab Results   Component Value Date    WBC 5.4 08/27/2020    HGB 8.4 (L) 08/27/2020    HCT 28.5 (L) 08/27/2020    MCV 85.1 08/27/2020     08/27/2020       PHYSICAL EXAM  BP (!) 115/58   Pulse 74   Temp 97.2 °F (36.2 °C)   Ht 6' 1\" (1.854 m)   Wt 235 lb 9.6 oz (106.9 kg)   SpO2 98%   BMI 31.08 kg/m²     Physical Exam  Constitutional:       Appearance: Normal appearance. HENT:      Head: Normocephalic and atraumatic. Eyes:      Comments: EOM grossly intact.    Cardiovascular:      Rate and Rhythm: Normal rate and regular rhythm. Heart sounds: No murmur. No friction rub. No gallop. Pulmonary:      Effort: Pulmonary effort is normal.      Breath sounds: Normal breath sounds. No wheezing, rhonchi or rales. Genitourinary:     Comments: Extensive ecchymosis to right groin and suprapubic region. No edema or TTP. Surgical site looks clean, dry, intact. Skin:     General: Skin is warm and dry. Neurological:      Mental Status: He is alert and oriented to person, place, and time. Comments: Cranial nerves II-XII grossly intact   Psychiatric:         Mood and Affect: Mood normal.         Behavior: Behavior normal.         ASSESSMENT & PLAN  1. Ecchymosis  Confirmed what the patient was told in the ED: the ecchymosis to his right groin will take several weeks to resolve. I explained that being on Eliquis makes him higher risk for bruising like this. Patient voiced understanding. 2. Stenosis of carotid artery, unspecified laterality  The patient was asymptomatic until he underwent a carotid artery ultrasound, during which the us probe was pushed very hard on his neck. The patient started having episodes of dizziness and syncope after this ultrasound, which he had never had before. Ordered CTA to evaluate this. Spoke to Dr. Sophia Shcumacher about this, and he agrees with this treatment plan. - CTA NECK W CONTRAST; Future  - OR DISCHARGE MEDS RECONCILED W/ CURRENT OUTPATIENT MED LIST    3. Dizziness  See above (#2).   - CTA NECK W CONTRAST; Future    4. Syncope, unspecified syncope type  See above (#2).   - CTA NECK W CONTRAST; Future          No follow-ups on file.             Electronically signed by Leslie Hendrix PA-C on 8/28/2020

## 2020-08-28 NOTE — ED NOTES
Discharge instructions reviewed with pt. All questions answered. Pt verbalized understanding.       Verna Ogden RN  08/27/20 [id number]

## 2020-09-17 ENCOUNTER — HOSPITAL ENCOUNTER (OUTPATIENT)
Dept: CT IMAGING | Age: 85
Discharge: HOME | End: 2020-09-17
Payer: MEDICARE

## 2020-09-17 PROCEDURE — 6360000004 HC RX CONTRAST MEDICATION: Performed by: PHYSICIAN ASSISTANT

## 2020-09-17 PROCEDURE — 2580000003 HC RX 258: Performed by: PHYSICIAN ASSISTANT

## 2020-09-17 PROCEDURE — 70498 CT ANGIOGRAPHY NECK: CPT

## 2020-09-17 RX ORDER — SODIUM CHLORIDE 0.9 % (FLUSH) 0.9 %
10 SYRINGE (ML) INJECTION ONCE
Status: COMPLETED | OUTPATIENT
Start: 2020-09-17 | End: 2020-09-17

## 2020-09-17 RX ADMIN — Medication 10 ML: at 13:59

## 2020-09-17 RX ADMIN — IOPAMIDOL 75 ML: 755 INJECTION, SOLUTION INTRAVENOUS at 13:59

## 2020-10-20 ENCOUNTER — OFFICE VISIT (OUTPATIENT)
Dept: FAMILY MEDICINE CLINIC | Age: 85
End: 2020-10-20
Payer: MEDICARE

## 2020-10-20 VITALS
DIASTOLIC BLOOD PRESSURE: 70 MMHG | HEIGHT: 73 IN | BODY MASS INDEX: 31.94 KG/M2 | OXYGEN SATURATION: 98 % | HEART RATE: 63 BPM | SYSTOLIC BLOOD PRESSURE: 136 MMHG | WEIGHT: 241 LBS | TEMPERATURE: 97.5 F

## 2020-10-20 DIAGNOSIS — I10 ESSENTIAL HYPERTENSION: ICD-10-CM

## 2020-10-20 DIAGNOSIS — E11.9 TYPE 2 DIABETES MELLITUS WITHOUT COMPLICATION, WITHOUT LONG-TERM CURRENT USE OF INSULIN (HCC): ICD-10-CM

## 2020-10-20 PROBLEM — M1A.09X0 CHRONIC IDIOPATHIC GOUT OF MULTIPLE SITES: Status: ACTIVE | Noted: 2020-10-20

## 2020-10-20 LAB
A/G RATIO: 1 (ref 1.1–2.2)
ALBUMIN SERPL-MCNC: 3.4 G/DL (ref 3.4–5)
ALP BLD-CCNC: 114 U/L (ref 40–129)
ALT SERPL-CCNC: 15 U/L (ref 10–40)
ANION GAP SERPL CALCULATED.3IONS-SCNC: 8 MMOL/L (ref 3–16)
AST SERPL-CCNC: 32 U/L (ref 15–37)
BASOPHILS ABSOLUTE: 0.1 K/UL (ref 0–0.2)
BASOPHILS RELATIVE PERCENT: 1.3 %
BILIRUB SERPL-MCNC: 0.4 MG/DL (ref 0–1)
BUN BLDV-MCNC: 20 MG/DL (ref 7–20)
CALCIUM SERPL-MCNC: 8.8 MG/DL (ref 8.3–10.6)
CHLORIDE BLD-SCNC: 105 MMOL/L (ref 99–110)
CO2: 24 MMOL/L (ref 21–32)
CREAT SERPL-MCNC: 1.1 MG/DL (ref 0.8–1.3)
EOSINOPHILS ABSOLUTE: 0.1 K/UL (ref 0–0.6)
EOSINOPHILS RELATIVE PERCENT: 2.7 %
GFR AFRICAN AMERICAN: >60
GFR NON-AFRICAN AMERICAN: >60
GLOBULIN: 3.5 G/DL
GLUCOSE BLD-MCNC: 96 MG/DL (ref 70–99)
HCT VFR BLD CALC: 30.4 % (ref 40.5–52.5)
HEMOGLOBIN: 9.4 G/DL (ref 13.5–17.5)
LYMPHOCYTES ABSOLUTE: 1.3 K/UL (ref 1–5.1)
LYMPHOCYTES RELATIVE PERCENT: 28.6 %
MCH RBC QN AUTO: 24.9 PG (ref 26–34)
MCHC RBC AUTO-ENTMCNC: 31 G/DL (ref 31–36)
MCV RBC AUTO: 80.2 FL (ref 80–100)
MONOCYTES ABSOLUTE: 0.4 K/UL (ref 0–1.3)
MONOCYTES RELATIVE PERCENT: 8.4 %
NEUTROPHILS ABSOLUTE: 2.6 K/UL (ref 1.7–7.7)
NEUTROPHILS RELATIVE PERCENT: 59 %
PDW BLD-RTO: 17.6 % (ref 12.4–15.4)
PLATELET # BLD: 167 K/UL (ref 135–450)
PMV BLD AUTO: 8.5 FL (ref 5–10.5)
POTASSIUM SERPL-SCNC: 4.6 MMOL/L (ref 3.5–5.1)
RBC # BLD: 3.79 M/UL (ref 4.2–5.9)
SODIUM BLD-SCNC: 137 MMOL/L (ref 136–145)
TOTAL PROTEIN: 6.9 G/DL (ref 6.4–8.2)
WBC # BLD: 4.4 K/UL (ref 4–11)

## 2020-10-20 PROCEDURE — G8484 FLU IMMUNIZE NO ADMIN: HCPCS | Performed by: FAMILY MEDICINE

## 2020-10-20 PROCEDURE — 1036F TOBACCO NON-USER: CPT | Performed by: FAMILY MEDICINE

## 2020-10-20 PROCEDURE — 4040F PNEUMOC VAC/ADMIN/RCVD: CPT | Performed by: FAMILY MEDICINE

## 2020-10-20 PROCEDURE — G8427 DOCREV CUR MEDS BY ELIG CLIN: HCPCS | Performed by: FAMILY MEDICINE

## 2020-10-20 PROCEDURE — 1123F ACP DISCUSS/DSCN MKR DOCD: CPT | Performed by: FAMILY MEDICINE

## 2020-10-20 PROCEDURE — G8417 CALC BMI ABV UP PARAM F/U: HCPCS | Performed by: FAMILY MEDICINE

## 2020-10-20 PROCEDURE — 99214 OFFICE O/P EST MOD 30 MIN: CPT | Performed by: FAMILY MEDICINE

## 2020-10-20 RX ORDER — ALLOPURINOL 100 MG/1
100 TABLET ORAL DAILY
Qty: 90 TABLET | Refills: 1 | Status: SHIPPED | OUTPATIENT
Start: 2020-10-20 | End: 2021-02-22

## 2020-10-21 LAB
ESTIMATED AVERAGE GLUCOSE: 154.2 MG/DL
HBA1C MFR BLD: 7 %

## 2020-10-21 ASSESSMENT — ENCOUNTER SYMPTOMS
DIARRHEA: 0
ABDOMINAL PAIN: 0
EYE PAIN: 0
NAUSEA: 0
TROUBLE SWALLOWING: 0
SHORTNESS OF BREATH: 0
WHEEZING: 0
BLOOD IN STOOL: 0
CHEST TIGHTNESS: 0
VOMITING: 0

## 2020-10-21 NOTE — PROGRESS NOTES
10/21/2020    Rachel Deng    Chief Complaint   Patient presents with    3 Month Follow-Up       HPI  History was obtained from the patient. Letty Zavala is a 80 y.o. male who presents today with follow-up on gout, coronary artery disease, hypertension, and bladder and prostate CA. Mirlande Gee Patient does follow-up closely with cardiology has past history of bypass grafting previous CHF and arrhythmias. He has not had any flare of gout. Denies current chest pain or shortness of breath. Blood pressure is reasonable. On review he did have a recent CTA of neck that showed significant carotid stenosis bilaterally and does need to have appropriate follow-up. He apparently did not see anybody after this was back will make official referral to Dr. Janelle Schneider and cardiovascular surgeons to get their opinion on this carotid stenosis. Please note he has had a past history I believe of a TIA. REVIEW OF SYMPTOMS    Review of Systems   Constitutional: Negative for activity change and fatigue. HENT: Negative for congestion, hearing loss, mouth sores and trouble swallowing. Eyes: Negative for pain and visual disturbance. Respiratory: Negative for chest tightness, shortness of breath and wheezing. Cardiovascular: Negative for chest pain and palpitations. Patient does have history of carotid stenosis. Past history of TIA. No current change in neurologic symptoms. Gastrointestinal: Negative for abdominal pain, blood in stool, diarrhea, nausea and vomiting. Endocrine: Negative for polydipsia and polyuria. Genitourinary: Negative for dysuria, frequency and urgency. Musculoskeletal: Negative for arthralgias, gait problem and neck stiffness. Skin: Negative for rash. Allergic/Immunologic: Negative for environmental allergies. Neurological: Negative for dizziness, seizures, speech difficulty and weakness. Hematological: Does not bruise/bleed easily.    Psychiatric/Behavioral: Negative for agitation, confusion and hallucinations.        PAST MEDICAL HISTORY  Past Medical History:   Diagnosis Date    Acid reflux     CAD (coronary artery disease)     Sees Dr. Neena Medina Cerebral artery occlusion with cerebral infarction ([facility] [address] 2016    No Residual     Cirrhosis, non-alcoholic ([address]     Constipation     Coronary atherosclerosis     Mk hematuria     Full dentures     Gallstone     Glaucoma     Bilateral Eyes    Gout Last Episode 2014    \"Right Big Toe, Left Elbow\"    Hepatitis Dx 1960's    \"I Just Got Over It\"    Hx of malignant neoplasm of prostate     Hyperlipidemia     Hypertension, essential     Malignant tumor of prostate ([facility] [address] 2007    \"Seed Implants\"    Malignant tumor of urinary bladder ([facility] [address] 2011    \"Went 6 Times, They Put Some Kind Of Liquid In My Bladder\"    Other specified abnormal findings of blood chemistry     Raised prostate specific antigen     Shortness of breath on exertion     Thyroid nodule     Transient cerebral ischemia     Type 2 diabetes mellitus ([facility] [address]     Wears glasses        FAMILY HISTORY  Family History   Problem Relation Age of Onset    Heart Disease Mother         Heart Attack, Open Heart Surgery    Early Death Mother 58        Heart Attack    Heart Attack Mother     High Blood Pressure Mother     Dementia Father     Alzheimer's Disease Father     Cancer Sister         Cancer Unsure What Kind    Dementia Brother     Cancer Brother         Lymphoma       SOCIAL HISTORY  Social History     Socioeconomic History    Marital status:      Spouse name: None    Number of children: None    Years of education: None    Highest education level: None   Occupational History    None   Social Needs    Financial resource strain: None    Food insecurity     Worry: None     Inability: None    Transportation needs     Medical: None     Non-medical: None   Tobacco Use    Smoking status: Former Smoker     Packs/day: 1.00     Years: 12.00     Pack years: 12.00 Types: Cigarettes, Pipe     Start date: 56     Last attempt to quit:      Years since quittin.8    Smokeless tobacco: Never Used   Substance and Sexual Activity    Alcohol use: No    Drug use: No    Sexual activity: Never   Lifestyle    Physical activity     Days per week: None     Minutes per session: None    Stress: None   Relationships    Social connections     Talks on phone: None     Gets together: None     Attends Church service: None     Active member of club or organization: None     Attends meetings of clubs or organizations: None     Relationship status: None    Intimate partner violence     Fear of current or ex partner: None     Emotionally abused: None     Physically abused: None     Forced sexual activity: None   Other Topics Concern    None   Social History Narrative    None        SURGICAL HISTORY  Past Surgical History:   Procedure Laterality Date    CARDIAC CATHETERIZATION  2017    CORONARY ARTERY BYPASS GRAFT      DENTAL SURGERY      All Teeth Extracted In Past    EYE SURGERY Right     Laser For Glaucoma    HEMORRHOIDECTOMY   And     PROSTATE SURGERY      \"Seed Implants\" For Prostate Cancer                 CURRENT MEDICATIONS  Current Outpatient Medications   Medication Sig Dispense Refill    allopurinol (ZYLOPRIM) 100 MG tablet Take 1 tablet by mouth daily Patient unsure of dosage 90 tablet 1    furosemide (LASIX) 20 MG tablet Take 20 mg by mouth daily      rosuvastatin (CRESTOR) 10 MG tablet Take 10 mg by mouth daily      metoprolol succinate (TOPROL XL) 25 MG extended release tablet Take 25 mg by mouth daily      clopidogrel (PLAVIX) 75 MG tablet Take 1 tablet by mouth daily 90 tablet 1    amiodarone (CORDARONE) 200 MG tablet Take 1 tablet by mouth daily 90 tablet 1    bimatoprost (LUMIGAN) 0.01 % SOLN ophthalmic drops Place 1 drop into both eyes nightly      colchicine (COLCRYS) 0.6 MG tablet Take 0.6 mg by mouth 2 times daily      Dorzolamide HCl-Timolol Mal (COSOPT PF OP) Apply to eye every morning Right Eye      pantoprazole (PROTONIX) 40 MG tablet Take 1 tablet by mouth daily 90 tablet 1    acetaminophen (TYLENOL) 325 MG tablet Take 2 tablets by mouth every 4 hours as needed for Pain (Patient not taking: Reported on 8/28/2020) 120 tablet 3     No current facility-administered medications for this visit. ALLERGIES  Allergies   Allergen Reactions    Lisinopril        PHYSICAL EXAM    /70   Pulse 63   Temp 97.5 °F (36.4 °C)   Ht 6' 1\" (1.854 m)   Wt 241 lb (109.3 kg)   SpO2 98%   BMI 31.80 kg/m²     Physical Exam  Vitals signs and nursing note reviewed. Constitutional:       General: He is not in acute distress. Appearance: Normal appearance. He is well-developed. He is not ill-appearing. HENT:      Head: Normocephalic and atraumatic. Nose: Nose normal.      Mouth/Throat:      Mouth: Mucous membranes are moist.      Pharynx: No oropharyngeal exudate. Eyes:      Pupils: Pupils are equal, round, and reactive to light. Neck:      Musculoskeletal: Normal range of motion and neck supple. Cardiovascular:      Rate and Rhythm: Normal rate and regular rhythm. Heart sounds: Normal heart sounds. Pulmonary:      Effort: Pulmonary effort is normal.      Breath sounds: Normal breath sounds. Abdominal:      Palpations: Abdomen is soft. Musculoskeletal: Normal range of motion. Skin:     General: Skin is warm and dry. Neurological:      General: No focal deficit present. Mental Status: He is alert and oriented to person, place, and time. Mental status is at baseline. Cranial Nerves: No cranial nerve deficit. Psychiatric:         Mood and Affect: Mood normal.         Behavior: Behavior normal.         Thought Content: Thought content normal.         ASSESSMENT & PLAN     Diagnosis Orders   1.  Type 2 diabetes mellitus without complication, without long-term current use of insulin ([address] HEMOGLOBIN A1C   2. Coronary artery disease involving native coronary artery of native heart without angina pectoris     3. Essential hypertension  CBC Auto Differential    COMPREHENSIVE METABOLIC PANEL   4. Bilateral carotid artery stenosis  [facility] ([facility]) - Kelsie Hernadez MD, Cardiothoracic Surgery, [place]   5. Idiopathic chronic gout of multiple sites without tophus     Will check CBC, CMP, and A1c. Will provide refills on meds. He is to get his high-dose flu shot at pharmacy in near future. We will make referral to Dr. Tayler Ames, at al for carotid stenosis. Continue on the rest his regimen as before and work on healthy lifestyle with exercise and social distancing. Return in about 4 months (around 2/20/2021).          Electronically signed by Yvonne Rojas MD on 10/21/2020

## 2020-11-03 PROBLEM — I25.10 CORONARY ARTERY DISEASE: Status: RESOLVED | Noted: 2017-06-20 | Resolved: 2020-11-03

## 2020-11-03 PROBLEM — I25.10 CORONARY ATHEROSCLEROSIS: Status: RESOLVED | Noted: 2020-11-03 | Resolved: 2020-11-03

## 2020-11-10 ENCOUNTER — ANESTHESIA EVENT (OUTPATIENT)
Dept: OPERATING ROOM | Age: 85
End: 2020-11-10

## 2020-11-10 ASSESSMENT — ENCOUNTER SYMPTOMS: SHORTNESS OF BREATH: 1

## 2020-11-10 NOTE — ANESTHESIA PRE PROCEDURE
 rosuvastatin (CRESTOR) 10 MG tablet Take 10 mg by mouth daily      metoprolol succinate (TOPROL XL) 25 MG extended release tablet Take 25 mg by mouth daily      clopidogrel (PLAVIX) 75 MG tablet Take 1 tablet by mouth daily 90 tablet 1    pantoprazole (PROTONIX) 40 MG tablet Take 1 tablet by mouth daily 90 tablet 1    amiodarone (CORDARONE) 200 MG tablet Take 1 tablet by mouth daily 90 tablet 1    acetaminophen (TYLENOL) 325 MG tablet Take 2 tablets by mouth every 4 hours as needed for Pain (Patient not taking: Reported on 8/28/2020) 120 tablet 3    bimatoprost (LUMIGAN) 0.01 % SOLN ophthalmic drops Place 1 drop into both eyes nightly      colchicine (COLCRYS) 0.6 MG tablet Take 0.6 mg by mouth 2 times daily      Dorzolamide HCl-Timolol Mal (COSOPT PF OP) Apply to eye every morning Right Eye       No current facility-administered medications for this encounter. Allergies:     Allergies   Allergen Reactions    Lisinopril        Problem List:    Patient Active Problem List   Diagnosis Code    TIA (transient ischemic attack) G45.9    CA of prostate ([facility] [address] C61    GERD (gastroesophageal reflux disease) K21.9    Gout M10.9    Bladder carcinoma ([address] C67.9    Essential hypertension I10    Multiple thyroid nodules E04.2    Cholelithiasis K80.20    Cirrhosis (HCC) K74.60    Bigeminy I49.8    Coronary artery disease involving native coronary artery of native heart without angina pectoris I25.10    S/P CABG (coronary artery bypass graft) Z95.1    Congestive heart failure (HCC) I50.9    Type 2 diabetes mellitus (HCC) E11.9    Gallstone K80.20    Cirrhosis, non-alcoholic (HCC) O04.96    Other specified abnormal findings of blood chemistry R79.89    Malignant tumor of prostate ([address] C61    Malignant tumor of urinary bladder (HCC) C67.9    Constipation K59.00    Mk hematuria R31.0    Hypertension, essential I10    Transient cerebral ischemia G45.9    Raised prostate specific antigen R97.20    Thyroid nodule E04.1    Hx of malignant neoplasm of prostate Z85.46    Iron deficiency anemia due to chronic blood loss D50.0    Stenosis of left carotid artery I65.22    Chronic idiopathic gout of multiple sites M1A. 84X0       Past Medical History:        Diagnosis Date    Acid reflux     CAD (coronary artery disease)     Sees Dr. Svetlana Cody Cerebral artery occlusion with cerebral infarction ([facility] [address] 2016    No Residual     Cirrhosis, non-alcoholic (HCC)     Constipation     Coronary atherosclerosis     Mk hematuria     Full dentures     Gallstone     Glaucoma     Bilateral Eyes    Gout Last Episode     \"Right Big Toe, Left Elbow\"    Hepatitis Dx 1960's    \"I Just Got Over It\"    Hx of malignant neoplasm of prostate     Hyperlipidemia     Hypertension, essential     Malignant tumor of prostate ([facility] [address]     \"Seed Implants\"    Malignant tumor of urinary bladder ([facility] [address]     \"Went 6 Times, They Put Some Kind Of Liquid In My Bladder\"    Other specified abnormal findings of blood chemistry     Raised prostate specific antigen     Shortness of breath on exertion     Thyroid nodule     Transient cerebral ischemia     Type 2 diabetes mellitus ([facility] [address]     Wears glasses        Past Surgical History:        Procedure Laterality Date    CARDIAC CATHETERIZATION  2017    CORONARY ARTERY BYPASS GRAFT      DENTAL SURGERY      All Teeth Extracted In Past    EYE SURGERY Right     Laser For Glaucoma    HEMORRHOIDECTOMY   And 2006    PROSTATE SURGERY      \"Seed Implants\" For Prostate Cancer       Social History:    Social History     Tobacco Use    Smoking status: Former Smoker     Packs/day: 1.00     Years: 12.00     Pack years: 12.00     Types: Cigarettes, Pipe     Start date:      Last attempt to quit: 1976     Years since quittin.8    Smokeless tobacco: Never Used   Substance Use Topics    Alcohol use:  No                                Counseling Cardiovascular:    (+) hypertension (on beta blocker):, CAD:, CABG/stent (s/p CABG x , 2017):, CHF:, JANE:, hyperlipidemia      ECG reviewed      Echocardiogram reviewed  Stress test reviewed             ROS comment: CP or SOB: NO   Exercise: NO       Echo 2017  EF 40-50%  This is a limited echo to assess for pericardial effusion. Technically difficult examination S/P open heart surgery. Visually estimated ejection fraction is within normal limits although   visualization of the left ventricle is limited possibly due to air  attenuation. Hypokinetic motion of the apical wall noted in the left ventricle. No evidence of any pericardial effusion. apex is hyokinetic   A wire is seen in RA and RV possible pacer or swan catheter      Cardiac cath 2017   1. EDP was around 10 mmHg . 2.  Right coronary artery, it is a dominant vessel. Ectatic vessel. Mid-90%  stenosis noted. And the PDA has about 70% stenosis noted. 3.  Left main is patent. 4. LAD has a mid 50% to 70% stenosis noted. 5.  The circumflex has a mild disease noted. OM 90+% stenosis and the distal  AV circ 100% occlusion _____ present. 6.  The patient has severe 3-vessel disease present.   We will discuss with the  CV team if patient is a candidate for surgery, but I think at the same time, I  will get GI and surgery involved to get the input regarding his gallbladder  issues.         EK2020  Normal sinus rhythm with sinus arrhythmia   Inferior infarct (cited on or before 2017)   Anterolateral infarct (cited on or before 2017)   Abnormal ECG   When compared with ECG of 2017 15:49,   premature ventricular complexes are no longer present   Questionable change in initial forces of Anterior leads   Nonspecific T wave abnormality has replaced inverted T waves in Anterior leads      Neuro/Psych:   (+) CVA (2016):, neuromuscular disease (unsteady gait, uses gaint, FALL RISK):, TIA,              ROS comment: HEENT: glaucoma    Mini-cog evaluation performed per anesthesia protocol,    > age 72. Scored: 5/5  Copy on chart for review. GI/Hepatic/Renal:   (+) GERD:, hepatitis (1960):, liver disease (cirrhosis ):, renal disease (prostate CA ,s/p brachytheropy, 2005. on Eligard, 2016. bladder CA s/p BCG treatment, TURBT. 2011):,           Endo/Other:    (+) Diabetes (diet controlled  A1c 7.0,  10/2020)Type II DM, , hypothyroidism (Multiple thyroid nodules ), blood dyscrasia (HGB: 9.2, HCT: 31.6, 11/12/2020. Previous HGB: 9.4, 10/2020 stable ): anemia:., .          Pt had PAT visit. ROS comment: Hx gout, on allopurinol and colchicine. Last flare 2014 Abdominal:           Vascular:           ROS comment: Carotid stensosis  70% occlusion left ICA. Anesthesia Plan        LUIS MANUEL Raines - CNP Chart reviewed, pt. Interviewed and examined. Anesthesia Evaluation will follow by a certified practitioner prior to surgery.   11/10/2020

## 2020-11-11 NOTE — PROGRESS NOTES
11/11/20 - Notified of pre-testing on 11/12/20 @1100, arrival @1030. Bring insurance card, picture ID, a list of your home medications and wear a mask. Alex verbalized understanding.

## 2020-11-12 ENCOUNTER — HOSPITAL ENCOUNTER (OUTPATIENT)
Dept: PREADMISSION TESTING | Age: 85
Discharge: HOME | End: 2020-11-16
Payer: MEDICARE

## 2020-11-12 ENCOUNTER — HOSPITAL ENCOUNTER (OUTPATIENT)
Dept: GENERAL RADIOLOGY | Age: 85
Discharge: HOME | End: 2020-11-12
Payer: MEDICARE

## 2020-11-12 VITALS
HEART RATE: 70 BPM | SYSTOLIC BLOOD PRESSURE: 142 MMHG | OXYGEN SATURATION: 100 % | WEIGHT: 242 LBS | DIASTOLIC BLOOD PRESSURE: 62 MMHG | HEIGHT: 73 IN | RESPIRATION RATE: 16 BRPM | BODY MASS INDEX: 32.07 KG/M2 | TEMPERATURE: 97.4 F

## 2020-11-12 LAB
ABO/RH: NORMAL
ANION GAP SERPL CALCULATED.3IONS-SCNC: 11 MMOL/L (ref 4–16)
ANTIBODY SCREEN: NEGATIVE
APTT: 37.4 SECONDS (ref 25.1–37.1)
BASOPHILS ABSOLUTE: 0.1 K/CU MM
BASOPHILS RELATIVE PERCENT: 1.1 % (ref 0–1)
BUN BLDV-MCNC: 16 MG/DL (ref 6–23)
CALCIUM SERPL-MCNC: 8.9 MG/DL (ref 8.3–10.6)
CHLORIDE BLD-SCNC: 104 MMOL/L (ref 99–110)
CO2: 23 MMOL/L (ref 21–32)
COMMENT: NORMAL
CREAT SERPL-MCNC: 1.1 MG/DL (ref 0.9–1.3)
DIFFERENTIAL TYPE: ABNORMAL
EKG ATRIAL RATE: 66 BPM
EKG DIAGNOSIS: NORMAL
EKG P AXIS: -5 DEGREES
EKG P-R INTERVAL: 184 MS
EKG Q-T INTERVAL: 526 MS
EKG QRS DURATION: 94 MS
EKG QTC CALCULATION (BAZETT): 551 MS
EKG R AXIS: 2 DEGREES
EKG T AXIS: 72 DEGREES
EKG VENTRICULAR RATE: 66 BPM
EOSINOPHILS ABSOLUTE: 0.1 K/CU MM
EOSINOPHILS RELATIVE PERCENT: 2 % (ref 0–3)
GFR AFRICAN AMERICAN: >60 ML/MIN/1.73M2
GFR NON-AFRICAN AMERICAN: >60 ML/MIN/1.73M2
GLUCOSE BLD-MCNC: 169 MG/DL (ref 70–99)
HCT VFR BLD CALC: 31.6 % (ref 42–52)
HEMOGLOBIN: 9.2 GM/DL (ref 13.5–18)
IMMATURE NEUTROPHIL %: 0.2 % (ref 0–0.43)
INR BLD: 1.11 INDEX
LYMPHOCYTES ABSOLUTE: 1 K/CU MM
LYMPHOCYTES RELATIVE PERCENT: 23 % (ref 24–44)
MCH RBC QN AUTO: 24.7 PG (ref 27–31)
MCHC RBC AUTO-ENTMCNC: 29.1 % (ref 32–36)
MCV RBC AUTO: 84.9 FL (ref 78–100)
MONOCYTES ABSOLUTE: 0.3 K/CU MM
MONOCYTES RELATIVE PERCENT: 7.7 % (ref 0–4)
NUCLEATED RBC %: 0 %
PDW BLD-RTO: 15.9 % (ref 11.7–14.9)
PLATELET # BLD: 178 K/CU MM (ref 140–440)
PMV BLD AUTO: 10.7 FL (ref 7.5–11.1)
POTASSIUM SERPL-SCNC: 4.1 MMOL/L (ref 3.5–5.1)
PROTHROMBIN TIME: 13.5 SECONDS (ref 11.7–14.5)
RBC # BLD: 3.72 M/CU MM (ref 4.6–6.2)
SEGMENTED NEUTROPHILS ABSOLUTE COUNT: 2.9 K/CU MM
SEGMENTED NEUTROPHILS RELATIVE PERCENT: 66 % (ref 36–66)
SODIUM BLD-SCNC: 138 MMOL/L (ref 135–145)
TOTAL IMMATURE NEUTOROPHIL: 0.01 K/CU MM
TOTAL NUCLEATED RBC: 0 K/CU MM
WBC # BLD: 4.4 K/CU MM (ref 4–10.5)

## 2020-11-12 PROCEDURE — 93010 ELECTROCARDIOGRAM REPORT: CPT | Performed by: INTERNAL MEDICINE

## 2020-11-12 PROCEDURE — 85610 PROTHROMBIN TIME: CPT

## 2020-11-12 PROCEDURE — 36415 COLL VENOUS BLD VENIPUNCTURE: CPT

## 2020-11-12 PROCEDURE — 85025 COMPLETE CBC W/AUTO DIFF WBC: CPT

## 2020-11-12 PROCEDURE — 86901 BLOOD TYPING SEROLOGIC RH(D): CPT

## 2020-11-12 PROCEDURE — 86900 BLOOD TYPING SEROLOGIC ABO: CPT

## 2020-11-12 PROCEDURE — U0002 COVID-19 LAB TEST NON-CDC: HCPCS

## 2020-11-12 PROCEDURE — 93005 ELECTROCARDIOGRAM TRACING: CPT | Performed by: SURGERY

## 2020-11-12 PROCEDURE — 86850 RBC ANTIBODY SCREEN: CPT

## 2020-11-12 PROCEDURE — 80048 BASIC METABOLIC PNL TOTAL CA: CPT

## 2020-11-12 PROCEDURE — 71046 X-RAY EXAM CHEST 2 VIEWS: CPT

## 2020-11-12 PROCEDURE — 85730 THROMBOPLASTIN TIME PARTIAL: CPT

## 2020-11-12 RX ORDER — APIXABAN 5 MG/1
TABLET, FILM COATED ORAL
COMMUNITY
Start: 2020-10-09

## 2020-11-15 LAB
SARS-COV-2: NOT DETECTED
SOURCE: NORMAL

## 2020-11-18 ENCOUNTER — OFFICE VISIT (OUTPATIENT)
Dept: PRIMARY CARE CLINIC | Age: 85
End: 2020-11-18
Payer: MEDICARE

## 2020-11-18 ENCOUNTER — HOSPITAL ENCOUNTER (OUTPATIENT)
Age: 85
Setting detail: SPECIMEN
Discharge: HOME | End: 2020-11-18
Payer: MEDICARE

## 2020-11-18 VITALS — HEART RATE: 52 BPM | OXYGEN SATURATION: 100 % | TEMPERATURE: 97.8 F

## 2020-11-18 PROCEDURE — 1036F TOBACCO NON-USER: CPT | Performed by: NURSE PRACTITIONER

## 2020-11-18 PROCEDURE — 1123F ACP DISCUSS/DSCN MKR DOCD: CPT | Performed by: NURSE PRACTITIONER

## 2020-11-18 PROCEDURE — G8417 CALC BMI ABV UP PARAM F/U: HCPCS | Performed by: NURSE PRACTITIONER

## 2020-11-18 PROCEDURE — 4040F PNEUMOC VAC/ADMIN/RCVD: CPT | Performed by: NURSE PRACTITIONER

## 2020-11-18 PROCEDURE — U0002 COVID-19 LAB TEST NON-CDC: HCPCS

## 2020-11-18 PROCEDURE — G8428 CUR MEDS NOT DOCUMENT: HCPCS | Performed by: NURSE PRACTITIONER

## 2020-11-18 PROCEDURE — 99213 OFFICE O/P EST LOW 20 MIN: CPT | Performed by: NURSE PRACTITIONER

## 2020-11-18 PROCEDURE — G8484 FLU IMMUNIZE NO ADMIN: HCPCS | Performed by: NURSE PRACTITIONER

## 2020-11-18 NOTE — PATIENT INSTRUCTIONS
Your COVID 19 test can take 3-5 days for the results come back. We ask that you make a Mychart page and view your test results this way. You will need to Self quarantine until you know your results. Increase fluids rest  Saline nasal spray as directed  Warm salt gargles for throat discomfort  Monitor temperature twice a day  Tylenol for fevers and/or discomfort. If symptoms are worse -Go to the ER. Follow up with your primary doctor    To Whom it May Concern:    Iain Villagran has been tested for COVID on 11/18/20. They may NOT return to work until their lab test results back and they been fever free for 3 days. If test is positive they must stay home for 2 weeks or until they test negative or as directed by the [facility] Department.

## 2020-11-18 NOTE — PROGRESS NOTES
11/18/20  Louie Irving  1935    FLU/COVID-19 CLINIC EVALUATION    HPI SYMPTOMS:    Employer: Retired    [] Fevers  [x] Chills  [] Cough  [] Coughing up blood  [] Chest Congestion  [] Nasal Congestion  [] Feeling short of breath  [] Sometimes  [] Frequently  [] All the time  [] Chest pain  [] Headaches  []Tolerable  [] Severe  [] Sore throat  [] Muscle aches  [] Nausea  [] Vomiting  []Unable to keep fluids down  [x] Diarrhea  []Severe    [] OTHER SYMPTOMS:      Symptom Duration:   [x] 1  [] 2   [] 3   [] 4    [] 5   [] 6   [] 7   [] 8   [] 9   [] 10   [] 11   [] 12   [] 13   [] 14   [] Longer than 14 days    Symptom course:   [] Worsening     [] Stable     [x] Improving    RISK FACTORS:    [] Pregnant or possibly pregnant  [x] Age over 61  [] Diabetes  [] Heart disease  [] Asthma  [] COPD/Other chronic lung diseases  [x] Active Cancer  [] On Chemotherapy  [] Taking oral steroids  [] History Lymphoma/Leukemia  [] Close contact with a lab confirmed COVID-19 patient within 14 days of symptom onset  [] History of travel from affected geographical areas within 14 days of symptom onset       VITALS:  There were no vitals filed for this visit. TESTS:    POCT FLU:  [] Positive     []Negative    ASSESSMENT:    [] Flu  [] Possible COVID-19  [] Strep    PLAN:    [] Discharge home with written instructions for:  [] Flu management  [] Possible COVID-19 infection with self-quarantine and management of symptoms  [] Follow-up with primary care physician or emergency department if worsens  [] Evaluation per physician/NP/PA in clinic  [] Sent to ER       An  electronic signature was used to authenticate this note.      --Martine Malone LPN on 28/26/2275 at 12:23 PM

## 2020-11-18 NOTE — PROGRESS NOTES
11/18/2020    HPI:  Chief complaint and history of present illness as per medical assistant/nurse documented today in the Flu/COVID-19 clinic. MEDICATIONS:  Prior to Visit Medications    Medication Sig Taking?  Authorizing Provider   ELIQUIS 5 MG TABS tablet take 1 tablet by mouth twice a day  Historical Provider, MD   allopurinol (ZYLOPRIM) 100 MG tablet Take 1 tablet by mouth daily Patient unsure of dosage  Yanely Partida MD   furosemide (LASIX) 20 MG tablet Take 20 mg by mouth daily  Historical Provider, MD   rosuvastatin (CRESTOR) 10 MG tablet Take 10 mg by mouth daily  Historical Provider, MD   metoprolol succinate (TOPROL XL) 25 MG extended release tablet Take 25 mg by mouth daily  Historical Provider, MD   pantoprazole (PROTONIX) 40 MG tablet Take 1 tablet by mouth daily  Yanely Partida MD   amiodarone (CORDARONE) 200 MG tablet Take 1 tablet by mouth daily  Yanely Partida MD   bimatoprost (LUMIGAN) 0.01 % SOLN ophthalmic drops Place 1 drop into both eyes nightly  Historical Provider, MD   colchicine (COLCRYS) 0.6 MG tablet Take 0.6 mg by mouth 2 times daily  Historical Provider, MD   Dorzolamide HCl-Timolol Mal (COSOPT PF OP) Apply to eye every morning Right Eye  Historical Provider, MD       Allergies   Allergen Reactions    Lisinopril      Unknown reaction per pt   ,   Past Medical History:   Diagnosis Date    Acid reflux     Takes Protonix    CAD (coronary artery disease)     Sees Dr. Vinnie Carmona Cerebral artery occlusion with cerebral infarction ([facility] [address] 2016    No Residual     Cirrhosis, non-alcoholic (HCC)     Coronary atherosclerosis     Full dentures     Glaucoma     Bilateral Eyes    Gout Last Episode 2014    \"Right Big Toe, Left Elbow\"    Hepatitis Dx 1960's    \"I Just Got Over It\"    History of blood transfusion 2017    CABG    Hx of malignant neoplasm of prostate     Hyperlipidemia     Hypertension, essential     Follows with Dr. Joyce Simpson    Left carotid stenosis     surgery scheduled 2020    Malignant tumor of prostate ([facility] [address]     \"Seed Implants\"    Malignant tumor of urinary bladder ([facility] [address]     \"Went 6 Times, They Put Some Kind Of Liquid In My Bladder\"    Raised prostate specific antigen     Shortness of breath on exertion     Thyroid nodule     thyroid nodules    Transient cerebral ischemia    ,   Past Surgical History:   Procedure Laterality Date    CARDIAC CATHETERIZATION  2017    CORONARY ARTERY BYPASS GRAFT  2017    CABG x 4    DENTAL SURGERY      All Teeth Extracted In Past    EYE SURGERY Right     Laser For Glaucoma    HEMORRHOIDECTOMY   And     PROSTATE SURGERY      \"Seed Implants\" For Prostate Cancer   ,   Social History     Tobacco Use    Smoking status: Former Smoker     Packs/day: 1.00     Years: 12.00     Pack years: 12.00     Types: Cigarettes, Pipe     Start date:      Last attempt to quit:      Years since quittin.9    Smokeless tobacco: Never Used   Substance Use Topics    Alcohol use: No    Drug use: No   ,   Family History   Problem Relation Age of Onset    Heart Disease Mother         Heart Attack, Open Heart Surgery    Early Death Mother 58        Heart Attack    Heart Attack Mother     High Blood Pressure Mother     Dementia Father     Alzheimer's Disease Father     Cancer Sister         Cancer Unsure What Kind    Dementia Brother     Cancer Brother         Lymphoma   ,   Immunization History   Administered Date(s) Administered    Influenza Vaccine, unspecified formulation 2014    Influenza, High Dose (Fluzone 65 yrs and older) 10/11/2013, 2015, 2017, 2018    Influenza, Triv, inactivated, subunit, adjuvanted, IM (Fluad 65 yrs and older) 10/01/2019    Pneumococcal Conjugate 13-valent ([id number]) 10/20/2014    Pneumococcal Polysaccharide ([id number]) 10/01/2019    Tdap (Boostrix, Adacel) 2016   ,   Health Maintenance   Topic Date Due    primary doctor    To Whom it May Concern:    Vesna Burch has been tested for COVID on 11/18/20. They may NOT return to work until their lab test results back and they been fever free for 3 days. If test is positive they must stay home for 2 weeks or until they test negative or as directed by the [facility] Department. - COVID-19 Ambulatory    2. Chills    - COVID-19 Ambulatory        FOLLOW-UP:  Return if symptoms worsen or fail to improve.     In addition to other information, the printed after visit summary provided to the patient includes:  [x] COVID-19 Self care instructions  [x] COVID-19 General patient information

## 2020-11-19 ENCOUNTER — ANESTHESIA (OUTPATIENT)
Dept: OPERATING ROOM | Age: 85
End: 2020-11-19

## 2020-11-20 LAB
SARS-COV-2: NOT DETECTED
SOURCE: NORMAL

## 2020-12-22 RX ORDER — AMIODARONE HYDROCHLORIDE 200 MG/1
TABLET ORAL
Qty: 90 TABLET | Refills: 1 | Status: SHIPPED | OUTPATIENT
Start: 2020-12-22

## 2021-01-05 ENCOUNTER — NURSE ONLY (OUTPATIENT)
Dept: FAMILY MEDICINE CLINIC | Age: 86
End: 2021-01-05
Payer: MEDICARE

## 2021-01-05 PROCEDURE — G0008 ADMIN INFLUENZA VIRUS VAC: HCPCS | Performed by: FAMILY MEDICINE

## 2021-01-05 PROCEDURE — 90694 VACC AIIV4 NO PRSRV 0.5ML IM: CPT | Performed by: FAMILY MEDICINE

## 2021-01-05 NOTE — PROGRESS NOTES
Vaccine Information Sheet, \"Influenza - Inactivated\"  given to Diaz Lantigua, or parent/legal guardian of  Diaz Lantigua and verbalized understanding. Patient responses:    Have you ever had a reaction to a flu vaccine? No  Do you have any current illness? No  Have you ever had Guillian [place] Syndrome? No  Do you have a serious allergy to any of the follow: Neomycin, Polymyxin, Thimerosal, eggs or egg products? No    Flu vaccine given per order. Please see immunization tab. Risks and benefits explained. Current VIS given.
Alert and oriented to person, place and time, memory intact, behavior appropriate to situation, PERRL.

## 2021-02-15 ENCOUNTER — TELEPHONE (OUTPATIENT)
Dept: FAMILY MEDICINE CLINIC | Age: 86
End: 2021-02-15

## 2021-02-15 DIAGNOSIS — G45.9 TIA (TRANSIENT ISCHEMIC ATTACK): Primary | ICD-10-CM

## 2021-02-15 DIAGNOSIS — C61 MALIGNANT TUMOR OF PROSTATE (HCC): ICD-10-CM

## 2021-02-15 DIAGNOSIS — C67.9 BLADDER CARCINOMA (HCC): Chronic | ICD-10-CM

## 2021-02-15 DIAGNOSIS — I50.9 CONGESTIVE HEART FAILURE, UNSPECIFIED HF CHRONICITY, UNSPECIFIED HEART FAILURE TYPE (HCC): ICD-10-CM

## 2021-02-15 DIAGNOSIS — C61 CA OF PROSTATE (HCC): Chronic | ICD-10-CM

## 2021-02-15 DIAGNOSIS — E11.9 TYPE 2 DIABETES MELLITUS WITHOUT COMPLICATION, WITHOUT LONG-TERM CURRENT USE OF INSULIN (HCC): ICD-10-CM

## 2021-02-19 ENCOUNTER — TELEPHONE (OUTPATIENT)
Dept: FAMILY MEDICINE CLINIC | Age: 86
End: 2021-02-19

## 2021-02-19 NOTE — TELEPHONE ENCOUNTER
Christine Schrader from [facility] [place] called and stated that do to staffing issues the nurse will not be able to go to patients home until Tuesday 2-23-21. Wanted to make sure that this would be ok, since its past the 48 hours.  Call Christine Schrader and advise

## 2021-02-22 ENCOUNTER — OFFICE VISIT (OUTPATIENT)
Dept: FAMILY MEDICINE CLINIC | Age: 86
End: 2021-02-22
Payer: MEDICARE

## 2021-02-22 VITALS
WEIGHT: 230 LBS | DIASTOLIC BLOOD PRESSURE: 58 MMHG | HEIGHT: 73 IN | BODY MASS INDEX: 30.48 KG/M2 | TEMPERATURE: 96.8 F | SYSTOLIC BLOOD PRESSURE: 120 MMHG | HEART RATE: 64 BPM

## 2021-02-22 DIAGNOSIS — R19.7 DIARRHEA, UNSPECIFIED TYPE: Primary | ICD-10-CM

## 2021-02-22 DIAGNOSIS — R63.4 WEIGHT LOSS: ICD-10-CM

## 2021-02-22 DIAGNOSIS — R53.1 WEAKNESS: ICD-10-CM

## 2021-02-22 DIAGNOSIS — R19.7 DIARRHEA, UNSPECIFIED TYPE: ICD-10-CM

## 2021-02-22 DIAGNOSIS — C61 CA PROSTATE, ADENOCA (HCC): ICD-10-CM

## 2021-02-22 PROBLEM — Z79.82 LONG TERM CURRENT USE OF ASPIRIN: Status: ACTIVE | Noted: 2017-07-17

## 2021-02-22 PROBLEM — R53.83 OTHER FATIGUE: Status: ACTIVE | Noted: 2017-07-17

## 2021-02-22 PROBLEM — R00.1 BRADYCARDIA, UNSPECIFIED: Status: ACTIVE | Noted: 2017-07-17

## 2021-02-22 PROBLEM — Z95.1 PRESENCE OF AORTOCORONARY BYPASS GRAFT: Status: ACTIVE | Noted: 2017-07-17

## 2021-02-22 PROBLEM — Z79.899 OTHER LONG TERM (CURRENT) DRUG THERAPY: Status: ACTIVE | Noted: 2017-07-17

## 2021-02-22 PROBLEM — E78.00 PURE HYPERCHOLESTEROLEMIA, UNSPECIFIED: Status: ACTIVE | Noted: 2017-07-17

## 2021-02-22 PROBLEM — I21.4 NON-ST ELEVATION MYOCARDIAL INFARCTION (NSTEMI) (HCC): Status: ACTIVE | Noted: 2017-07-17

## 2021-02-22 LAB
A/G RATIO: 0.6 (ref 1.1–2.2)
ALBUMIN SERPL-MCNC: 2.7 G/DL (ref 3.4–5)
ALP BLD-CCNC: 181 U/L (ref 40–129)
ALT SERPL-CCNC: 12 U/L (ref 10–40)
ANION GAP SERPL CALCULATED.3IONS-SCNC: 13 MMOL/L (ref 3–16)
AST SERPL-CCNC: 27 U/L (ref 15–37)
BILIRUB SERPL-MCNC: 0.8 MG/DL (ref 0–1)
BUN BLDV-MCNC: 36 MG/DL (ref 7–20)
CALCIUM SERPL-MCNC: 8.8 MG/DL (ref 8.3–10.6)
CHLORIDE BLD-SCNC: 107 MMOL/L (ref 99–110)
CO2: 20 MMOL/L (ref 21–32)
COMMENT UA: ABNORMAL
CREAT SERPL-MCNC: 1.6 MG/DL (ref 0.8–1.3)
EPITHELIAL CELLS, UA: 5 /HPF (ref 0–5)
GFR AFRICAN AMERICAN: 50
GFR NON-AFRICAN AMERICAN: 41
GLOBULIN: 4.4 G/DL
GLUCOSE BLD-MCNC: 107 MG/DL (ref 70–99)
HCT VFR BLD CALC: 30.4 % (ref 40.5–52.5)
HEMOGLOBIN: 9.5 G/DL (ref 13.5–17.5)
HYALINE CASTS: ABNORMAL /LPF (ref 0–2)
MCH RBC QN AUTO: 25 PG (ref 26–34)
MCHC RBC AUTO-ENTMCNC: 31.2 G/DL (ref 31–36)
MCV RBC AUTO: 80.1 FL (ref 80–100)
PDW BLD-RTO: 20.5 % (ref 12.4–15.4)
PLATELET # BLD: 253 K/UL (ref 135–450)
PMV BLD AUTO: 8.4 FL (ref 5–10.5)
POTASSIUM SERPL-SCNC: 4.6 MMOL/L (ref 3.5–5.1)
PROSTATE SPECIFIC ANTIGEN: <0.01 NG/ML (ref 0–4)
RBC # BLD: 3.8 M/UL (ref 4.2–5.9)
RBC UA: 8 /HPF (ref 0–4)
SODIUM BLD-SCNC: 140 MMOL/L (ref 136–145)
TOTAL PROTEIN: 7.1 G/DL (ref 6.4–8.2)
URINE TYPE: ABNORMAL
WBC # BLD: 6.9 K/UL (ref 4–11)
WBC UA: 5 /HPF (ref 0–5)

## 2021-02-22 PROCEDURE — G8417 CALC BMI ABV UP PARAM F/U: HCPCS | Performed by: FAMILY MEDICINE

## 2021-02-22 PROCEDURE — 1036F TOBACCO NON-USER: CPT | Performed by: FAMILY MEDICINE

## 2021-02-22 PROCEDURE — G8484 FLU IMMUNIZE NO ADMIN: HCPCS | Performed by: FAMILY MEDICINE

## 2021-02-22 PROCEDURE — 99214 OFFICE O/P EST MOD 30 MIN: CPT | Performed by: FAMILY MEDICINE

## 2021-02-22 PROCEDURE — 4040F PNEUMOC VAC/ADMIN/RCVD: CPT | Performed by: FAMILY MEDICINE

## 2021-02-22 PROCEDURE — G8427 DOCREV CUR MEDS BY ELIG CLIN: HCPCS | Performed by: FAMILY MEDICINE

## 2021-02-22 PROCEDURE — 1123F ACP DISCUSS/DSCN MKR DOCD: CPT | Performed by: FAMILY MEDICINE

## 2021-02-22 RX ORDER — PANTOPRAZOLE SODIUM 40 MG/1
40 TABLET, DELAYED RELEASE ORAL DAILY
Qty: 90 TABLET | Refills: 1 | Status: SHIPPED | OUTPATIENT
Start: 2021-02-22 | End: 2021-05-23

## 2021-02-22 ASSESSMENT — PATIENT HEALTH QUESTIONNAIRE - PHQ9
SUM OF ALL RESPONSES TO PHQ QUESTIONS 1-9: 2
SUM OF ALL RESPONSES TO PHQ9 QUESTIONS 1 & 2: 2
1. LITTLE INTEREST OR PLEASURE IN DOING THINGS: 1

## 2021-02-22 ASSESSMENT — ENCOUNTER SYMPTOMS
CHEST TIGHTNESS: 0
DIARRHEA: 1
WHEEZING: 0
TROUBLE SWALLOWING: 0
SHORTNESS OF BREATH: 0
NAUSEA: 0
BLOOD IN STOOL: 0
ABDOMINAL PAIN: 0
EYE PAIN: 0
VOMITING: 0

## 2021-02-22 NOTE — PROGRESS NOTES
2/22/2021    Maine Stanley    Chief Complaint   Patient presents with    3 Month Follow-Up     4 month    Diarrhea     x2 weeks    Abdominal Pain     every time he eats. has to go to bathroom to relieve. x1.5 weeks, not eating now to avoid it.  Fatigue     weakness, unable to walk. [facility]. HPI  History was obtained from the patient and family member. Lopez Stanford is a 80 y.o. male who presents today with history of increasing weakness inability to ambulate and increased amounts of copious diarrhea for 2 weeks. He has been unable to walk because of weakness for about 2 weeks he was seen in a wheelchair today. [facility] [place] and treated for sinus infection with an unknown antibiotic. Since that time he has developed increasing diarrhea that he states is intractable taking occasional Imodium with only partial relief. No recent fever has a little bit of queasiness but no associated emesis. Weight is gone down about 12 pounds in the last 4 months. Patient does have a history remote of CA prostate. Maya Britton REVIEW OF SYMPTOMS    Review of Systems   Constitutional: Positive for unexpected weight change. Negative for activity change, fatigue and fever. HENT: Negative for congestion, hearing loss, mouth sores and trouble swallowing. Eyes: Negative for pain and visual disturbance. Respiratory: Negative for chest tightness, shortness of breath and wheezing. Cardiovascular: Negative for chest pain and palpitations. Gastrointestinal: Positive for diarrhea. Negative for abdominal pain, blood in stool, nausea and vomiting. Endocrine: Negative. Genitourinary: Negative for dysuria, frequency and urgency. Musculoskeletal: Negative for arthralgias, gait problem and neck stiffness. Skin: Negative for rash. Allergic/Immunologic: Negative for environmental allergies. Neurological: Negative for dizziness, seizures, speech difficulty and weakness. Hematological: Does not bruise/bleed easily. Psychiatric/Behavioral: Negative for agitation, confusion and hallucinations.        PAST MEDICAL HISTORY  Past Medical History:   Diagnosis Date    Acid reflux     Takes Protonix    CAD (coronary artery disease)     Sees Dr. Quintin Lance    Cerebral artery occlusion with cerebral infarction ([facility] [facility] 2016    No Residual     Cirrhosis, non-alcoholic (HCC)     Coronary atherosclerosis     Full dentures     Glaucoma     Bilateral Eyes    Gout Last Episode 2014    \"Right Big Toe, Left Elbow\"    Hepatitis Dx 1960's    \"I Just Got Over It\"    History of blood transfusion 2017    CABG    Hx of malignant neoplasm of prostate     Hyperlipidemia     Hypertension, essential     Follows with Dr. Michael Santillan Left carotid stenosis     surgery scheduled 11/19/2020    Malignant tumor of prostate ([facility] [address] [facility] 2007    \"Seed Implants\"    Malignant tumor of urinary bladder ([facility] [address] 2011    \"Went 6 Times, They Put Some Kind Of Liquid In My Bladder\"    Raised prostate specific antigen     Shortness of breath on exertion     Thyroid nodule     thyroid nodules    Transient cerebral ischemia        FAMILY HISTORY  Family History   Problem Relation Age of Onset    Heart Disease Mother         Heart Attack, Open Heart Surgery    Early Death Mother 58        Heart Attack    Heart Attack Mother     High Blood Pressure Mother     Dementia Father     Alzheimer's Disease Father     Cancer Sister         Cancer Unsure What Kind    Dementia Brother     Cancer Brother         Lymphoma       SOCIAL HISTORY  Social History     Socioeconomic History    Marital status:      Spouse name: None    Number of children: None    Years of education: None    Highest education level: None   Occupational History    None   Social Needs    Financial resource strain: None    Food insecurity     Worry: None Inability: None    Transportation needs     Medical: None     Non-medical: None   Tobacco Use    Smoking status: Former Smoker     Packs/day: 1.00     Years: 12.00     Pack years: 12.00     Types: Cigarettes, Pipe     Start date:      Quit date:      Years since quittin.1    Smokeless tobacco: Never Used   Substance and Sexual Activity    Alcohol use: No    Drug use: No    Sexual activity: Never   Lifestyle    Physical activity     Days per week: None     Minutes per session: None    Stress: None   Relationships    Social connections     Talks on phone: None     Gets together: None     Attends [employer] service: None     Active member of club or organization: None     Attends meetings of clubs or organizations: None     Relationship status: None    Intimate partner violence     Fear of current or ex partner: None     Emotionally abused: None     Physically abused: None     Forced sexual activity: None   Other Topics Concern    None   Social History Narrative    None        SURGICAL HISTORY  Past Surgical History:   Procedure Laterality Date    CARDIAC CATHETERIZATION  2017    CORONARY ARTERY BYPASS GRAFT  2017    CABG x 4    DENTAL SURGERY      All Teeth Extracted In Past    EYE SURGERY Right 's    Laser For Glaucoma    HEMORRHOIDECTOMY   And    [address]      \"Seed Implants\" For Prostate Cancer                 CURRENT MEDICATIONS  Current Outpatient Medications   Medication Sig Dispense Refill    pantoprazole (PROTONIX) 40 MG tablet Take 1 tablet by mouth daily 90 tablet 1    amiodarone (CORDARONE) 200 MG tablet take 1 tablet by mouth once daily 90 tablet 1    ELIQUIS 5 MG TABS tablet take 1 tablet by mouth twice a day      allopurinol (ZYLOPRIM) 100 MG tablet Take 1 tablet by mouth daily Patient unsure of dosage 90 tablet 1    furosemide (LASIX) 20 MG tablet Take 20 mg by mouth daily  rosuvastatin (CRESTOR) 10 MG tablet Take 10 mg by mouth daily      metoprolol succinate (TOPROL XL) 25 MG extended release tablet Take 25 mg by mouth daily      bimatoprost (LUMIGAN) 0.01 % SOLN ophthalmic drops Place 1 drop into both eyes nightly      colchicine (COLCRYS) 0.6 MG tablet Take 0.6 mg by mouth 2 times daily      Dorzolamide HCl-Timolol Mal (COSOPT PF OP) Apply to eye every morning Right Eye       No current facility-administered medications for this visit. ALLERGIES  Allergies   Allergen Reactions    Lisinopril      Unknown reaction per pt       PHYSICAL EXAM    BP (!) 120/58 (Site: Right Upper Arm, Position: Sitting, Cuff Size: Medium Adult)   Pulse 64   Temp 96.8 °F (36 °C)   Ht 6' 1\" (1.854 m)   Wt 230 lb (104.3 kg) Comment: per pt, unable to stand  BMI 30.34 kg/m²     Physical Exam  Vitals signs and nursing note reviewed. Constitutional:       General: He is not in acute distress. Appearance: Normal appearance. He is well-developed. He is ill-appearing. He is not toxic-appearing. HENT:      Head: Normocephalic and atraumatic. Mouth/Throat:      Mouth: Mucous membranes are moist.      Pharynx: Oropharynx is clear. No posterior oropharyngeal erythema. Eyes:      Pupils: Pupils are equal, round, and reactive to light. Neck:      Musculoskeletal: Normal range of motion and neck supple. Cardiovascular:      Rate and Rhythm: Normal rate and regular rhythm. Heart sounds: Normal heart sounds. Pulmonary:      Effort: Pulmonary effort is normal.      Breath sounds: Normal breath sounds. Abdominal:      General: Bowel sounds are normal.      Palpations: Abdomen is soft. There is no mass. Tenderness: There is no abdominal tenderness. There is no rebound. Musculoskeletal: Normal range of motion. General: No deformity. Right lower leg: No edema. Left lower leg: No edema. Skin:     General: Skin is warm and dry.    Neurological: General: No focal deficit present. Mental Status: He is alert and oriented to person, place, and time. Mental status is at baseline. Cranial Nerves: No cranial nerve deficit. Motor: Weakness present. Psychiatric:         Mood and Affect: Mood normal.         Behavior: Behavior normal.         Thought Content: Thought content normal.         ASSESSMENT & PLAN     Diagnosis Orders   1. Diarrhea, unspecified type  CBC    COMPREHENSIVE METABOLIC PANEL    GI Bacterial Pathogens By PCR   2. Weight loss  MICROSCOPIC URINALYSIS   3. CA prostate, adenoca (HCC)  PSA screening   4. Weakness     Will check CBC, CMP, UA, and a PSA today along with stool for GI pathogens(PCR). Refills on regular medicine to be provided. He is to sip Gatorade fairly continuously use over-the-counter Imodium or Kaopectate for diarrhea. Maintain usual regimen otherwise he is to follow-up for these test results we will do further evaluation or treatment as indicated by situation. Consider CT of the abdomen/ pelvis and/or eventual colonoscopy if symptoms do not janiya. He is to stay off milk products and other dietary changes that increase his diarrhea. Return if symptoms worsen or fail to improve.          Electronically signed by Rachana Daigle MD on 2/22/2021

## 2021-02-23 ENCOUNTER — TELEPHONE (OUTPATIENT)
Dept: FAMILY MEDICINE CLINIC | Age: 86
End: 2021-02-23

## 2021-02-23 RX ORDER — FUROSEMIDE 20 MG/1
20 TABLET ORAL DAILY
Qty: 90 TABLET | Refills: 1 | Status: SHIPPED | OUTPATIENT
Start: 2021-02-23

## 2021-02-23 NOTE — TELEPHONE ENCOUNTER
Spoke with home care and they are doing start of care and needed to review medications. Stated he seems to be having increased swelling and some SOB and was wondering about possibly restarting the lasix. At appointment patient stated he hadn't been taking it for a while so it was removed from list.  Previously took 20mg daily.

## 2021-02-23 NOTE — TELEPHONE ENCOUNTER
Home care nurse called back and stated patient would like to stay on lasix. Script sent at previous dosing of 20mg daily.

## 2021-02-26 DIAGNOSIS — R19.7 DIARRHEA, UNSPECIFIED TYPE: ICD-10-CM

## 2021-02-27 LAB — GI BACTERIAL PATHOGENS BY PCR: NORMAL

## 2021-03-02 ENCOUNTER — TELEPHONE (OUTPATIENT)
Dept: FAMILY MEDICINE CLINIC | Age: 86
End: 2021-03-02

## 2021-03-02 NOTE — TELEPHONE ENCOUNTER
Sonya Dominguez called from [facility] [place] and requested the OV notes from 2-22-21 To complete face to face. OV Note was printed and faxed to [phone] Received a confirmation that the fax was successful.

## 2021-03-04 ENCOUNTER — TELEPHONE (OUTPATIENT)
Dept: FAMILY MEDICINE CLINIC | Age: 86
End: 2021-03-04

## 2021-03-04 NOTE — TELEPHONE ENCOUNTER
1ST TIME WITH PT AND PT APPEARS SOB-LUNGS CLEAR,O2 89-95 TAKING, HEAD CONGESTION,COUGHING, NO FEVER. HAS NOT BEEN TESTED FOR COVID. PLEASE ADVISE

## 2021-03-05 ENCOUNTER — TELEPHONE (OUTPATIENT)
Dept: FAMILY MEDICINE CLINIC | Age: 86
End: 2021-03-05

## 2021-03-05 NOTE — TELEPHONE ENCOUNTER
To Elias Ann---    Family did not want an assessment of the patient -----she is returning your call.

## 2021-03-05 NOTE — TELEPHONE ENCOUNTER
Please advise patient to use over-the-counter Rx such as Coricidin HBP and Tylenol. May also use Delsym cough medicine if he would like on an needed basis. If he has not been tested for Covid he should be tested.  If he is negative- he needs his Covid shots and his wife also

## 2021-03-08 ENCOUNTER — TELEPHONE (OUTPATIENT)
Dept: FAMILY MEDICINE CLINIC | Age: 86
End: 2021-03-08

## 2021-03-08 DIAGNOSIS — R19.7 DIARRHEA, UNSPECIFIED TYPE: Primary | ICD-10-CM

## 2021-03-08 DIAGNOSIS — R06.02 SOB (SHORTNESS OF BREATH): ICD-10-CM

## 2021-03-08 DIAGNOSIS — R60.9 EDEMA, UNSPECIFIED TYPE: ICD-10-CM

## 2021-03-08 NOTE — TELEPHONE ENCOUNTER
GI infection panel on stool was negative. Does not look like it has a C. difficile on it.   Plan: Be sure we get stool for C. difficile ordered and call in some Questran packets- he is to use 1 packet twice a day in liquid of choice as needed for diarrhea control #30 with 2 refills

## 2021-03-09 ENCOUNTER — TELEPHONE (OUTPATIENT)
Dept: FAMILY MEDICINE CLINIC | Age: 86
End: 2021-03-09

## 2021-03-09 RX ORDER — CHOLESTYRAMINE 4 G/9G
1 POWDER, FOR SUSPENSION ORAL 2 TIMES DAILY PRN
Qty: 30 PACKET | Refills: 2 | Status: SHIPPED | OUTPATIENT
Start: 2021-03-09

## 2021-03-09 NOTE — TELEPHONE ENCOUNTER
Spoke with [facility] at homecare. States patient has had a 3# weight gain from Sunday to Monday. Bilateral edema, cough now sounds wet, O2 sats dropping down to 88-89 at times. Lung sounds diminished on right lower is new. Currently on 20mg lasix daily. As for diarrhea states it is mainly happening 30-45 minutes after eating. Questran script sent today for patient to start. Home care notified to obtain stool sample for c diff panel.

## 2021-03-17 ENCOUNTER — TELEPHONE (OUTPATIENT)
Dept: FAMILY MEDICINE CLINIC | Age: 86
End: 2021-03-17
